# Patient Record
Sex: FEMALE | Race: OTHER | HISPANIC OR LATINO
[De-identification: names, ages, dates, MRNs, and addresses within clinical notes are randomized per-mention and may not be internally consistent; named-entity substitution may affect disease eponyms.]

---

## 2022-11-14 ENCOUNTER — APPOINTMENT (OUTPATIENT)
Dept: OBGYN | Facility: CLINIC | Age: 32
End: 2022-11-14

## 2022-11-14 ENCOUNTER — ASOB RESULT (OUTPATIENT)
Age: 32
End: 2022-11-14

## 2022-11-14 VITALS
WEIGHT: 252 LBS | DIASTOLIC BLOOD PRESSURE: 64 MMHG | BODY MASS INDEX: 46.38 KG/M2 | SYSTOLIC BLOOD PRESSURE: 100 MMHG | HEIGHT: 62 IN | HEART RATE: 82 BPM

## 2022-11-14 DIAGNOSIS — Z78.9 OTHER SPECIFIED HEALTH STATUS: ICD-10-CM

## 2022-11-14 DIAGNOSIS — O36.80X0 PREGNANCY WITH INCONCLUSIVE FETAL VIABILITY, NOT APPLICABLE OR UNSPECIFIED: ICD-10-CM

## 2022-11-14 DIAGNOSIS — Z83.3 FAMILY HISTORY OF DIABETES MELLITUS: ICD-10-CM

## 2022-11-14 PROBLEM — Z00.00 ENCOUNTER FOR PREVENTIVE HEALTH EXAMINATION: Status: ACTIVE | Noted: 2022-11-14

## 2022-11-14 PROCEDURE — 99204 OFFICE O/P NEW MOD 45 MIN: CPT | Mod: TH

## 2022-11-14 PROCEDURE — ZZZZZ: CPT

## 2022-11-14 PROCEDURE — 76817 TRANSVAGINAL US OBSTETRIC: CPT | Mod: 59

## 2022-11-14 NOTE — PROCEDURE
[Amenorrhea] : Amenorrhea [Transvaginal Ultrasound] : transvaginal ultrasound [FreeTextEntry4] : SIUP AT 9+ WKS \par FHR POS

## 2022-11-14 NOTE — PHYSICAL EXAM
[Appropriately responsive] : appropriately responsive [Alert] : alert [No Acute Distress] : no acute distress [No Lymphadenopathy] : no lymphadenopathy [Soft] : soft [Non-tender] : non-tender [Non-distended] : non-distended [No HSM] : No HSM [No Lesions] : no lesions [No Mass] : no mass [Oriented x3] : oriented x3 [Normal] : normal [Uterine Adnexae] : normal

## 2022-11-14 NOTE — HISTORY OF PRESENT ILLNESS
[Patient reported PAP Smear was normal] : Patient reported PAP Smear was normal [N] : Patient denies prior pregnancies [FreeTextEntry1] : 33 yo P-0 LMP- 9- Is here for initial visit for prenatal care.  Patient had a sonogram November 2-7 weeks 4 days and a simple right ovarian cyst 2.7 cm.\par She states she had vaginal bleeding spotting after intercourse today.\par Denies any pain.  Also reports having stomach virus since last night nausea and vomiting and diarrhea.\par Patient weight 252 LB [TextBox_4] : GYNHX\par No history of fibroids, cysts, or STDs\par 11/REG/5\par LAST PAP 1/11/22- slightly abnormal Pap smear and HPV, she did not go for colposcopy [Papeardate] : 1-2022 [PGHxTotal] : 0

## 2022-11-14 NOTE — DISCUSSION/SUMMARY
[FreeTextEntry1] : 33 YO P0 AT 9 + WKS CEDRIC 23, THREATEN , gastroenteritis viral, OBESITY \par SONO SIUP AT 9 + WKS FHR + , \par Hydration apple juice and crackers\par NT in 3 weeks\par PAP NV, PRENATAL LABS\par EARLY GCT \par Return to office 1 month

## 2022-11-28 ENCOUNTER — NON-APPOINTMENT (OUTPATIENT)
Age: 32
End: 2022-11-28

## 2022-12-06 ENCOUNTER — APPOINTMENT (OUTPATIENT)
Dept: OBGYN | Facility: CLINIC | Age: 32
End: 2022-12-06

## 2022-12-06 ENCOUNTER — TRANSCRIPTION ENCOUNTER (OUTPATIENT)
Age: 32
End: 2022-12-06

## 2022-12-06 VITALS
SYSTOLIC BLOOD PRESSURE: 112 MMHG | HEIGHT: 62 IN | HEART RATE: 80 BPM | DIASTOLIC BLOOD PRESSURE: 85 MMHG | BODY MASS INDEX: 44.35 KG/M2 | WEIGHT: 241 LBS | TEMPERATURE: 97.9 F

## 2022-12-06 DIAGNOSIS — N89.8 OTHER SPECIFIED NONINFLAMMATORY DISORDERS OF VAGINA: ICD-10-CM

## 2022-12-06 LAB
HCG UR QL: NEGATIVE
QUALITY CONTROL: YES

## 2022-12-06 PROCEDURE — 81025 URINE PREGNANCY TEST: CPT

## 2022-12-06 PROCEDURE — 99213 OFFICE O/P EST LOW 20 MIN: CPT | Mod: TH

## 2022-12-06 RX ORDER — TERCONAZOLE 8 MG/G
0.8 CREAM VAGINAL
Qty: 1 | Refills: 0 | Status: ACTIVE | COMMUNITY
Start: 2022-12-06 | End: 1900-01-01

## 2022-12-06 NOTE — COUNSELING
[Nutrition/ Exercise/ Weight Management] : nutrition, exercise, weight management [Vitamins/Supplements] : vitamins/supplements [Contraception/ Emergency Contraception/ Safe Sexual Practices] : contraception, emergency contraception, safe sexual practices [Pregnancy Options] : pregnancy options

## 2022-12-06 NOTE — DISCUSSION/SUMMARY
[FreeTextEntry1] : 32-year-old P1 status post missed AB, treatment for appendicitis, yeast infection\par ucg negative today, patient has no pain or bleeding\par Contraceptive options discussed with patient\par Phexxi and condoms advised\par Patient will go forward with colonoscopy and possible appendectomy\par Weight loss discussed\par Patient may follow-up with [facility]  for her annual Pap smear\par Preconception counseling done\par Terazol 3 vaginal

## 2022-12-06 NOTE — HISTORY OF PRESENT ILLNESS
[FreeTextEntry1] : 31 yo p0010 Patient is here for follow/up missed ab 11-.\par Patient states that November 15 in the morning she started bleeding and continue to have nausea vomiting diarrhea.  She called 911 and was taken to [facility] ER where she stayed for several hours and continued bleeding.  Her blood pressure dropped and she was evaluated where she was given 2 units of blood transfusion and Gyn consult was done where she had?  D&C versus removal of retained products of conception at bedside.  She had pelvic sonogram.  She was given Cytotec 4 pills per vagina.  She also had a CAT scan following which she had a tube placed into the peritoneal cavity through her buttock where the abdominal fluid was drained due to infected appendicitis and she was given antibiotics.  She stayed in the hospital for from 11/ 15-11/ 26. \par She she was advised to have colonoscopy after release and she will schedule her appendectomy when she is stable.  She stopped bleeding a week ago.\par \par Patient reports some vaginal itching antibiotics

## 2022-12-15 ENCOUNTER — APPOINTMENT (OUTPATIENT)
Dept: OBGYN | Facility: CLINIC | Age: 32
End: 2022-12-15

## 2023-02-01 ENCOUNTER — APPOINTMENT (OUTPATIENT)
Dept: OBGYN | Facility: CLINIC | Age: 33
End: 2023-02-01
Payer: MEDICAID

## 2023-02-01 VITALS
HEIGHT: 63 IN | HEART RATE: 79 BPM | SYSTOLIC BLOOD PRESSURE: 118 MMHG | DIASTOLIC BLOOD PRESSURE: 78 MMHG | WEIGHT: 241 LBS | BODY MASS INDEX: 42.7 KG/M2

## 2023-02-01 DIAGNOSIS — Z86.39 PERSONAL HISTORY OF OTHER ENDOCRINE, NUTRITIONAL AND METABOLIC DISEASE: ICD-10-CM

## 2023-02-01 PROCEDURE — 99395 PREV VISIT EST AGE 18-39: CPT

## 2023-02-02 PROBLEM — Z86.39 HISTORY OF OBESITY: Status: RESOLVED | Noted: 2023-02-02 | Resolved: 2023-02-02

## 2023-02-02 NOTE — HISTORY OF PRESENT ILLNESS
[Patient reported PAP Smear was normal] : Patient reported PAP Smear was normal [N] : Patient denies prior pregnancies [FreeTextEntry1] : 33 yo P-0010  LMP- 1- IS here for annual exam , menses every 30 days a part , lasting 5-7 days , moderate flow , no cramps \par She had period after sab in december and then in january , spotted 1/11-1/14 then had a period for 5 days.\par patient using condoms\par \par h/o D &S fo MAB with retained pco and blood transfusion , infected appendix  ( see prior note)\par Patient is scheduled to have colonoscopy after which she will have appendectomy  [TextBox_4] : GYNHX\par No history of fibroids, cysts, or STDs\par 11/REG/5\par LAST PAP 1/11/22- slightly abnormal Pap smear and HPV, she did not go for colposcopy [Papeardate] : 1-2022 [PGHxTotal] : 0

## 2023-02-02 NOTE — PHYSICAL EXAM
[Appropriately responsive] : appropriately responsive [Alert] : alert [No Acute Distress] : no acute distress [No Lymphadenopathy] : no lymphadenopathy [Regular Rate Rhythm] : regular rate rhythm [No Murmurs] : no murmurs [Soft] : soft [Non-tender] : non-tender [Non-distended] : non-distended [No HSM] : No HSM [No Lesions] : no lesions [No Mass] : no mass [Oriented x3] : oriented x3 [Examination Of The Breasts] : a normal appearance [No Discharge] : no discharge [No Masses] : no breast masses were palpable [Labia Majora] : normal [Labia Minora] : normal [No Bleeding] : There was no active vaginal bleeding [Normal] : normal [Normal Position] : in a normal position [Uterine Adnexae] : normal [FreeTextEntry6] : difficult to asses adnexa due to body habitus, but grossly nl

## 2023-02-02 NOTE — DISCUSSION/SUMMARY
[FreeTextEntry1] : 33 yo  for annual exam ,obesity\par pap hpv\par plan- colonoscopy , appendectomy\par contraception d/w patient \par weight loss d/w patient

## 2023-02-06 LAB
CYTOLOGY CVX/VAG DOC THIN PREP: NORMAL
HPV HIGH+LOW RISK DNA PNL CVX: NOT DETECTED

## 2023-04-15 ENCOUNTER — INPATIENT (INPATIENT)
Facility: HOSPITAL | Age: 33
LOS: 2 days | Discharge: HOME | DRG: 721 | End: 2023-04-18
Attending: SURGERY | Admitting: SURGERY
Payer: MEDICAID

## 2023-04-15 VITALS
WEIGHT: 250 LBS | HEART RATE: 103 BPM | DIASTOLIC BLOOD PRESSURE: 65 MMHG | TEMPERATURE: 100 F | OXYGEN SATURATION: 97 % | SYSTOLIC BLOOD PRESSURE: 127 MMHG | RESPIRATION RATE: 18 BRPM

## 2023-04-15 DIAGNOSIS — Z90.49 ACQUIRED ABSENCE OF OTHER SPECIFIED PARTS OF DIGESTIVE TRACT: ICD-10-CM

## 2023-04-15 LAB
ALBUMIN SERPL ELPH-MCNC: 3.9 G/DL — SIGNIFICANT CHANGE UP (ref 3.5–5.2)
ALP SERPL-CCNC: 95 U/L — SIGNIFICANT CHANGE UP (ref 30–115)
ALT FLD-CCNC: 21 U/L — SIGNIFICANT CHANGE UP (ref 0–41)
ANION GAP SERPL CALC-SCNC: 11 MMOL/L — SIGNIFICANT CHANGE UP (ref 7–14)
ANION GAP SERPL CALC-SCNC: 14 MMOL/L — SIGNIFICANT CHANGE UP (ref 7–14)
APPEARANCE UR: CLEAR — SIGNIFICANT CHANGE UP
APPEARANCE UR: CLEAR — SIGNIFICANT CHANGE UP
AST SERPL-CCNC: 14 U/L — SIGNIFICANT CHANGE UP (ref 0–41)
BACTERIA # UR AUTO: NEGATIVE — SIGNIFICANT CHANGE UP
BACTERIA # UR AUTO: NEGATIVE — SIGNIFICANT CHANGE UP
BASOPHILS # BLD AUTO: 0.04 K/UL — SIGNIFICANT CHANGE UP (ref 0–0.2)
BASOPHILS # BLD AUTO: 0.05 K/UL — SIGNIFICANT CHANGE UP (ref 0–0.2)
BASOPHILS NFR BLD AUTO: 0.2 % — SIGNIFICANT CHANGE UP (ref 0–1)
BASOPHILS NFR BLD AUTO: 0.4 % — SIGNIFICANT CHANGE UP (ref 0–1)
BILIRUB SERPL-MCNC: 0.4 MG/DL — SIGNIFICANT CHANGE UP (ref 0.2–1.2)
BILIRUB UR-MCNC: NEGATIVE — SIGNIFICANT CHANGE UP
BILIRUB UR-MCNC: NEGATIVE — SIGNIFICANT CHANGE UP
BLD GP AB SCN SERPL QL: SIGNIFICANT CHANGE UP
BUN SERPL-MCNC: 6 MG/DL — LOW (ref 10–20)
BUN SERPL-MCNC: 7 MG/DL — LOW (ref 10–20)
CALCIUM SERPL-MCNC: 8.8 MG/DL — SIGNIFICANT CHANGE UP (ref 8.4–10.5)
CALCIUM SERPL-MCNC: 8.9 MG/DL — SIGNIFICANT CHANGE UP (ref 8.4–10.5)
CHLORIDE SERPL-SCNC: 101 MMOL/L — SIGNIFICANT CHANGE UP (ref 98–110)
CHLORIDE SERPL-SCNC: 102 MMOL/L — SIGNIFICANT CHANGE UP (ref 98–110)
CO2 SERPL-SCNC: 21 MMOL/L — SIGNIFICANT CHANGE UP (ref 17–32)
CO2 SERPL-SCNC: 24 MMOL/L — SIGNIFICANT CHANGE UP (ref 17–32)
COLOR SPEC: YELLOW — SIGNIFICANT CHANGE UP
COLOR SPEC: YELLOW — SIGNIFICANT CHANGE UP
CREAT SERPL-MCNC: 0.6 MG/DL — LOW (ref 0.7–1.5)
CREAT SERPL-MCNC: 0.6 MG/DL — LOW (ref 0.7–1.5)
DIFF PNL FLD: NEGATIVE — SIGNIFICANT CHANGE UP
DIFF PNL FLD: NEGATIVE — SIGNIFICANT CHANGE UP
EGFR: 121 ML/MIN/1.73M2 — SIGNIFICANT CHANGE UP
EGFR: 121 ML/MIN/1.73M2 — SIGNIFICANT CHANGE UP
EOSINOPHIL # BLD AUTO: 0.04 K/UL — SIGNIFICANT CHANGE UP (ref 0–0.7)
EOSINOPHIL # BLD AUTO: 0.06 K/UL — SIGNIFICANT CHANGE UP (ref 0–0.7)
EOSINOPHIL NFR BLD AUTO: 0.2 % — SIGNIFICANT CHANGE UP (ref 0–8)
EOSINOPHIL NFR BLD AUTO: 0.4 % — SIGNIFICANT CHANGE UP (ref 0–8)
EPI CELLS # UR: 11 /HPF — HIGH (ref 0–5)
EPI CELLS # UR: 11 /HPF — HIGH (ref 0–5)
GLUCOSE SERPL-MCNC: 106 MG/DL — HIGH (ref 70–99)
GLUCOSE SERPL-MCNC: 134 MG/DL — HIGH (ref 70–99)
GLUCOSE UR QL: NEGATIVE — SIGNIFICANT CHANGE UP
GLUCOSE UR QL: NEGATIVE — SIGNIFICANT CHANGE UP
HCG SERPL QL: NEGATIVE — SIGNIFICANT CHANGE UP
HCT VFR BLD CALC: 28.3 % — LOW (ref 37–47)
HCT VFR BLD CALC: 28.4 % — LOW (ref 37–47)
HGB BLD-MCNC: 9.1 G/DL — LOW (ref 12–16)
HGB BLD-MCNC: 9.1 G/DL — LOW (ref 12–16)
HYALINE CASTS # UR AUTO: 10 /LPF — HIGH (ref 0–7)
HYALINE CASTS # UR AUTO: 2 /LPF — SIGNIFICANT CHANGE UP (ref 0–7)
IMM GRANULOCYTES NFR BLD AUTO: 0.3 % — SIGNIFICANT CHANGE UP (ref 0.1–0.3)
IMM GRANULOCYTES NFR BLD AUTO: 0.5 % — HIGH (ref 0.1–0.3)
KETONES UR-MCNC: ABNORMAL
KETONES UR-MCNC: NEGATIVE — SIGNIFICANT CHANGE UP
LEUKOCYTE ESTERASE UR-ACNC: NEGATIVE — SIGNIFICANT CHANGE UP
LEUKOCYTE ESTERASE UR-ACNC: NEGATIVE — SIGNIFICANT CHANGE UP
LIDOCAIN IGE QN: 23 U/L — SIGNIFICANT CHANGE UP (ref 7–60)
LYMPHOCYTES # BLD AUTO: 1.3 K/UL — SIGNIFICANT CHANGE UP (ref 1.2–3.4)
LYMPHOCYTES # BLD AUTO: 1.58 K/UL — SIGNIFICANT CHANGE UP (ref 1.2–3.4)
LYMPHOCYTES # BLD AUTO: 11.6 % — LOW (ref 20.5–51.1)
LYMPHOCYTES # BLD AUTO: 7.4 % — LOW (ref 20.5–51.1)
MAGNESIUM SERPL-MCNC: 2 MG/DL — SIGNIFICANT CHANGE UP (ref 1.8–2.4)
MCHC RBC-ENTMCNC: 25.1 PG — LOW (ref 27–31)
MCHC RBC-ENTMCNC: 25.5 PG — LOW (ref 27–31)
MCHC RBC-ENTMCNC: 32 G/DL — SIGNIFICANT CHANGE UP (ref 32–37)
MCHC RBC-ENTMCNC: 32.2 G/DL — SIGNIFICANT CHANGE UP (ref 32–37)
MCV RBC AUTO: 78.5 FL — LOW (ref 81–99)
MCV RBC AUTO: 79.3 FL — LOW (ref 81–99)
MONOCYTES # BLD AUTO: 0.68 K/UL — HIGH (ref 0.1–0.6)
MONOCYTES # BLD AUTO: 0.92 K/UL — HIGH (ref 0.1–0.6)
MONOCYTES NFR BLD AUTO: 5 % — SIGNIFICANT CHANGE UP (ref 1.7–9.3)
MONOCYTES NFR BLD AUTO: 5.3 % — SIGNIFICANT CHANGE UP (ref 1.7–9.3)
NEUTROPHILS # BLD AUTO: 11.23 K/UL — HIGH (ref 1.4–6.5)
NEUTROPHILS # BLD AUTO: 15.13 K/UL — HIGH (ref 1.4–6.5)
NEUTROPHILS NFR BLD AUTO: 82.1 % — HIGH (ref 42.2–75.2)
NEUTROPHILS NFR BLD AUTO: 86.6 % — HIGH (ref 42.2–75.2)
NITRITE UR-MCNC: NEGATIVE — SIGNIFICANT CHANGE UP
NITRITE UR-MCNC: NEGATIVE — SIGNIFICANT CHANGE UP
NRBC # BLD: 0 /100 WBCS — SIGNIFICANT CHANGE UP (ref 0–0)
NRBC # BLD: 0 /100 WBCS — SIGNIFICANT CHANGE UP (ref 0–0)
PH UR: 7 — SIGNIFICANT CHANGE UP (ref 5–8)
PH UR: 7.5 — SIGNIFICANT CHANGE UP (ref 5–8)
PHOSPHATE SERPL-MCNC: 3.2 MG/DL — SIGNIFICANT CHANGE UP (ref 2.1–4.9)
PLATELET # BLD AUTO: 339 K/UL — SIGNIFICANT CHANGE UP (ref 130–400)
PLATELET # BLD AUTO: 342 K/UL — SIGNIFICANT CHANGE UP (ref 130–400)
PMV BLD: 9.5 FL — SIGNIFICANT CHANGE UP (ref 7.4–10.4)
PMV BLD: 9.7 FL — SIGNIFICANT CHANGE UP (ref 7.4–10.4)
POTASSIUM SERPL-MCNC: 4.1 MMOL/L — SIGNIFICANT CHANGE UP (ref 3.5–5)
POTASSIUM SERPL-MCNC: 4.4 MMOL/L — SIGNIFICANT CHANGE UP (ref 3.5–5)
POTASSIUM SERPL-SCNC: 4.1 MMOL/L — SIGNIFICANT CHANGE UP (ref 3.5–5)
POTASSIUM SERPL-SCNC: 4.4 MMOL/L — SIGNIFICANT CHANGE UP (ref 3.5–5)
PROT SERPL-MCNC: 6.9 G/DL — SIGNIFICANT CHANGE UP (ref 6–8)
PROT UR-MCNC: ABNORMAL
PROT UR-MCNC: ABNORMAL
RBC # BLD: 3.57 M/UL — LOW (ref 4.2–5.4)
RBC # BLD: 3.62 M/UL — LOW (ref 4.2–5.4)
RBC # FLD: 15.2 % — HIGH (ref 11.5–14.5)
RBC # FLD: 15.3 % — HIGH (ref 11.5–14.5)
RBC CASTS # UR COMP ASSIST: 2 /HPF — SIGNIFICANT CHANGE UP (ref 0–4)
RBC CASTS # UR COMP ASSIST: 3 /HPF — SIGNIFICANT CHANGE UP (ref 0–4)
SARS-COV-2 RNA SPEC QL NAA+PROBE: SIGNIFICANT CHANGE UP
SODIUM SERPL-SCNC: 136 MMOL/L — SIGNIFICANT CHANGE UP (ref 135–146)
SODIUM SERPL-SCNC: 137 MMOL/L — SIGNIFICANT CHANGE UP (ref 135–146)
SP GR SPEC: 1.03 — HIGH (ref 1.01–1.03)
SP GR SPEC: >1.05 (ref 1.01–1.03)
UROBILINOGEN FLD QL: SIGNIFICANT CHANGE UP
UROBILINOGEN FLD QL: SIGNIFICANT CHANGE UP
WBC # BLD: 13.67 K/UL — HIGH (ref 4.8–10.8)
WBC # BLD: 17.49 K/UL — HIGH (ref 4.8–10.8)
WBC # FLD AUTO: 13.67 K/UL — HIGH (ref 4.8–10.8)
WBC # FLD AUTO: 17.49 K/UL — HIGH (ref 4.8–10.8)
WBC UR QL: 1 /HPF — SIGNIFICANT CHANGE UP (ref 0–5)
WBC UR QL: 2 /HPF — SIGNIFICANT CHANGE UP (ref 0–5)

## 2023-04-15 PROCEDURE — 84100 ASSAY OF PHOSPHORUS: CPT

## 2023-04-15 PROCEDURE — 74177 CT ABD & PELVIS W/CONTRAST: CPT | Mod: 26,MA

## 2023-04-15 PROCEDURE — 83735 ASSAY OF MAGNESIUM: CPT

## 2023-04-15 PROCEDURE — 80048 BASIC METABOLIC PNL TOTAL CA: CPT

## 2023-04-15 PROCEDURE — 81001 URINALYSIS AUTO W/SCOPE: CPT

## 2023-04-15 PROCEDURE — 99285 EMERGENCY DEPT VISIT HI MDM: CPT

## 2023-04-15 PROCEDURE — 87040 BLOOD CULTURE FOR BACTERIA: CPT

## 2023-04-15 PROCEDURE — U0005: CPT

## 2023-04-15 PROCEDURE — U0003: CPT

## 2023-04-15 PROCEDURE — 36415 COLL VENOUS BLD VENIPUNCTURE: CPT

## 2023-04-15 PROCEDURE — 71045 X-RAY EXAM CHEST 1 VIEW: CPT

## 2023-04-15 PROCEDURE — 83615 LACTATE (LD) (LDH) ENZYME: CPT

## 2023-04-15 PROCEDURE — 71045 X-RAY EXAM CHEST 1 VIEW: CPT | Mod: 26

## 2023-04-15 PROCEDURE — 85025 COMPLETE CBC W/AUTO DIFF WBC: CPT

## 2023-04-15 RX ORDER — SODIUM CHLORIDE 9 MG/ML
500 INJECTION, SOLUTION INTRAVENOUS ONCE
Refills: 0 | Status: COMPLETED | OUTPATIENT
Start: 2023-04-15 | End: 2023-04-15

## 2023-04-15 RX ORDER — SODIUM CHLORIDE 9 MG/ML
1000 INJECTION INTRAMUSCULAR; INTRAVENOUS; SUBCUTANEOUS ONCE
Refills: 0 | Status: COMPLETED | OUTPATIENT
Start: 2023-04-15 | End: 2023-04-15

## 2023-04-15 RX ORDER — ONDANSETRON 8 MG/1
4 TABLET, FILM COATED ORAL EVERY 6 HOURS
Refills: 0 | Status: DISCONTINUED | OUTPATIENT
Start: 2023-04-15 | End: 2023-04-18

## 2023-04-15 RX ORDER — SODIUM CHLORIDE 9 MG/ML
1000 INJECTION, SOLUTION INTRAVENOUS ONCE
Refills: 0 | Status: COMPLETED | OUTPATIENT
Start: 2023-04-15 | End: 2023-04-15

## 2023-04-15 RX ORDER — KETOROLAC TROMETHAMINE 30 MG/ML
15 SYRINGE (ML) INJECTION EVERY 6 HOURS
Refills: 0 | Status: DISCONTINUED | OUTPATIENT
Start: 2023-04-15 | End: 2023-04-18

## 2023-04-15 RX ORDER — PIPERACILLIN AND TAZOBACTAM 4; .5 G/20ML; G/20ML
3.38 INJECTION, POWDER, LYOPHILIZED, FOR SOLUTION INTRAVENOUS EVERY 8 HOURS
Refills: 0 | Status: DISCONTINUED | OUTPATIENT
Start: 2023-04-15 | End: 2023-04-18

## 2023-04-15 RX ORDER — ACETAMINOPHEN 500 MG
1000 TABLET ORAL ONCE
Refills: 0 | Status: COMPLETED | OUTPATIENT
Start: 2023-04-15 | End: 2023-04-15

## 2023-04-15 RX ORDER — ENOXAPARIN SODIUM 100 MG/ML
40 INJECTION SUBCUTANEOUS EVERY 24 HOURS
Refills: 0 | Status: DISCONTINUED | OUTPATIENT
Start: 2023-04-15 | End: 2023-04-18

## 2023-04-15 RX ORDER — MORPHINE SULFATE 50 MG/1
4 CAPSULE, EXTENDED RELEASE ORAL ONCE
Refills: 0 | Status: DISCONTINUED | OUTPATIENT
Start: 2023-04-15 | End: 2023-04-15

## 2023-04-15 RX ORDER — ACETAMINOPHEN 500 MG
650 TABLET ORAL EVERY 6 HOURS
Refills: 0 | Status: DISCONTINUED | OUTPATIENT
Start: 2023-04-15 | End: 2023-04-18

## 2023-04-15 RX ORDER — ACETAMINOPHEN 500 MG
975 TABLET ORAL ONCE
Refills: 0 | Status: COMPLETED | OUTPATIENT
Start: 2023-04-15 | End: 2023-04-15

## 2023-04-15 RX ORDER — SODIUM CHLORIDE 9 MG/ML
1000 INJECTION, SOLUTION INTRAVENOUS
Refills: 0 | Status: DISCONTINUED | OUTPATIENT
Start: 2023-04-15 | End: 2023-04-17

## 2023-04-15 RX ORDER — PIPERACILLIN AND TAZOBACTAM 4; .5 G/20ML; G/20ML
3.38 INJECTION, POWDER, LYOPHILIZED, FOR SOLUTION INTRAVENOUS ONCE
Refills: 0 | Status: COMPLETED | OUTPATIENT
Start: 2023-04-15 | End: 2023-04-15

## 2023-04-15 RX ORDER — FAMOTIDINE 10 MG/ML
20 INJECTION INTRAVENOUS EVERY 12 HOURS
Refills: 0 | Status: DISCONTINUED | OUTPATIENT
Start: 2023-04-15 | End: 2023-04-18

## 2023-04-15 RX ADMIN — SODIUM CHLORIDE 1000 MILLILITER(S): 9 INJECTION, SOLUTION INTRAVENOUS at 08:51

## 2023-04-15 RX ADMIN — SODIUM CHLORIDE 125 MILLILITER(S): 9 INJECTION, SOLUTION INTRAVENOUS at 10:45

## 2023-04-15 RX ADMIN — Medication 975 MILLIGRAM(S): at 08:57

## 2023-04-15 RX ADMIN — PIPERACILLIN AND TAZOBACTAM 25 GRAM(S): 4; .5 INJECTION, POWDER, LYOPHILIZED, FOR SOLUTION INTRAVENOUS at 22:54

## 2023-04-15 RX ADMIN — Medication 650 MILLIGRAM(S): at 16:41

## 2023-04-15 RX ADMIN — MORPHINE SULFATE 4 MILLIGRAM(S): 50 CAPSULE, EXTENDED RELEASE ORAL at 05:00

## 2023-04-15 RX ADMIN — Medication 650 MILLIGRAM(S): at 15:41

## 2023-04-15 RX ADMIN — PIPERACILLIN AND TAZOBACTAM 25 GRAM(S): 4; .5 INJECTION, POWDER, LYOPHILIZED, FOR SOLUTION INTRAVENOUS at 14:10

## 2023-04-15 RX ADMIN — MORPHINE SULFATE 4 MILLIGRAM(S): 50 CAPSULE, EXTENDED RELEASE ORAL at 04:45

## 2023-04-15 RX ADMIN — Medication 15 MILLIGRAM(S): at 21:12

## 2023-04-15 RX ADMIN — PIPERACILLIN AND TAZOBACTAM 200 GRAM(S): 4; .5 INJECTION, POWDER, LYOPHILIZED, FOR SOLUTION INTRAVENOUS at 07:48

## 2023-04-15 RX ADMIN — Medication 975 MILLIGRAM(S): at 09:57

## 2023-04-15 RX ADMIN — SODIUM CHLORIDE 1000 MILLILITER(S): 9 INJECTION INTRAMUSCULAR; INTRAVENOUS; SUBCUTANEOUS at 04:14

## 2023-04-15 NOTE — CONSULT NOTE ADULT - ASSESSMENT
ASSESSMENT:  32yF now s/p Lap appy from an outside facility who presents concern for post op pain. Physical exam findings, imaging, and labs as documented above.   CT scan reviewed, foreign body in question is the staple from the appendectomy    PLAN:  - No acute surgical intervention  - IV Resuscitatation  - Augment for 1 week  - Follow up with Dr. Kim in the office  - Dispo per ED with return precautions    Above plan discussed with Attending Surgeon Dr. Kim, patient, and Primary team  04-15-23 @ 08:22   ASSESSMENT:  32yF now s/p Lap appy from an outside facility who presents concern for post op pain. Physical exam findings, imaging, and labs as documented above.   CT scan reviewed, foreign body in question is the staple from the appendectomy    PLAN:  - Admit to general surgery under Dr. Kim  - NPO  - IV Resuscitatation  - zosyn  - Hemodynamic monitoring    Above plan discussed with Attending Surgeon Dr. Kim, patient, and Primary team  04-15-23 @ 08:22

## 2023-04-15 NOTE — H&P ADULT - ASSESSMENT
ASSESSMENT:  32yF now s/p Lap appy from an outside facility who presents concern for post op pain. Physical exam findings, imaging, and labs as documented above.   CT scan reviewed, foreign body in question is the staple from the appendectomy  Tachycardic  Fever 100.6  leukocytosis    PLAN:  - Admit to general surgery under Dr. Kim  - NPO  - IV Resuscitatation  - zosyn  - Hemodynamic monitoring  - DVT ppx    Above plan discussed with Attending Surgeon Dr. Kim, patient, and Primary team  04-15-23 @ 09:20

## 2023-04-15 NOTE — ED PROVIDER NOTE - PROGRESS NOTE DETAILS
CO- pt reassessed after signout, pain controlled when laying flat. abd soft but tenderness to RLQ, pending surg eval, abx hanging

## 2023-04-15 NOTE — ED PROVIDER NOTE - PHYSICAL EXAMINATION
Constitutional: Well developed, well nourished. NAD  Head: Normocephalic, atraumatic.  Eyes: PERRL, EOMI.  ENT: No nasal discharge. Mucous membranes dry.  Neck: Supple. Painless ROM.  Cardiovascular:  Regular rate and rhythm   Pulmonary:   Lungs clear to auscultation bilaterally.   Abdominal: Soft mild diffuse tenderness to lower abdomen; laparoscopic incisions note without signs of infection  Extremities. Pelvis stable. No lower extremity edema, symmetric calves.  Skin: No rashes, cyanosis.  Neuro: AAOx3. No focal neurological deficits.  Psych: Normal mood. Normal affect.

## 2023-04-15 NOTE — ED ADULT TRIAGE NOTE - CHIEF COMPLAINT QUOTE
pt presents for abdominal pain, chills, fever, had appendectomy on 4/10. pt states [facility] told her they left a piece of metal in her during surgery.

## 2023-04-15 NOTE — CONSULT NOTE ADULT - SUBJECTIVE AND OBJECTIVE BOX
GENERAL SURGERY CONSULT NOTE    Patient: NADIA MURPHY , 32y ([birth year])Female   MRN: [id number]  Location: [facility] ED  Visit: 04-15-23 Emergency  Date: 04-15-23 @ 08:22    HPI:  Ms. Murphy is a pleasant 31yo F now s/p Lap appy from an outside facility presenting with complaint of mid abd pain with as associated temperature of 100.1 at home. She initially presented back to the outside facility and was informed of a foreign body on CT scan. She left and came here for further evaluation and care. She denies urinary changes, chest pain, SOB    PAST MEDICAL & SURGICAL HISTORY:  H/O appendicitis          Home Medications:        VITALS:  T(F): 99.6 (04-15-23 @ 02:28), Max: 99.6 (04-15-23 @ 02:28)  HR: 103 (04-15-23 @ 02:28) (103 - 103)  BP: 127/65 (04-15-23 @ 02:28) (127/65 - 127/65)  RR: 18 (04-15-23 @ 02:28) (18 - 18)  SpO2: 97% (04-15-23 @ 02:28) (97% - 97%)    PHYSICAL EXAM:  General: NAD, AAOx3, calm and cooperative  HEENT: NCAT, CHRISTOPHER, EOMI, Trachea ML, Neck supple  Cardiac: reg rate  Respiratory: Non-labored on RA  Abdomen: Soft, appropriately tender, non-distended. Incisions are clean dry and intact  Skin: Warm/dry, normal color, no jaundice  Incision/wound: healing well, dressings in place, clean, dry and intact    MEDICATIONS  (STANDING):    MEDICATIONS  (PRN):      LAB/STUDIES:                        9.1    13.67 )-----------( 342      ( 15 Apr 2023 03:52 )             28.4     04-15    136  |  101  |  7<L>  ----------------------------<  134<H>  4.1   |  24  |  0.6<L>    Ca    8.9      15 Apr 2023 03:52    TPro  6.9  /  Alb  3.9  /  TBili  0.4  /  DBili  x   /  AST  14  /  ALT  21  /  AlkPhos  95  04-15      LIVER FUNCTIONS - ( 15 Apr 2023 03:52 )  Alb: 3.9 g/dL / Pro: 6.9 g/dL / ALK PHOS: 95 U/L / ALT: 21 U/L / AST: 14 U/L / GGT: x           IMAGING:  < from: CT Abdomen and Pelvis w/ IV Cont (04.15.23 @ 06:17) >  Post appendectomy withmetallic suture material at the region of the   cecum, likely corresponding to foreign body finding on outside report    Mesentery edema and fat stranding adjacent to cecum. No contained fluid   collection with wall enhancement to suggest abscess. Nointra-abdominal   free air. Findings suggestive of phlegmonous changes/severe inflammatory   changes.    < end of copied text >      ACCESS DEVICES:  [x] Peripheral IV     GENERAL SURGERY CONSULT NOTE    Patient: NADIA MURPHY , 32y ([birth year])Female   MRN: [id number]  Location: [facility] ED  Visit: 04-15-23 Emergency  Date: 04-15-23 @ 08:22    HPI:  Ms. Murphy is a pleasant 31yo F now s/p Lap appy from an outside facility presenting with complaint of mid abd pain with as associated temperature of 100.1 at home. She initially presented back to the outside facility and was informed of a foreign body on CT scan. She left and came here for further evaluation and care. She denies urinary changes, chest pain, SOB    PAST MEDICAL & SURGICAL HISTORY:  H/O appendicitis      Home Medications:    VITALS:  T(F): 99.6 (04-15-23 @ 02:28), Max: 99.6 (04-15-23 @ 02:28)  HR: 103 (04-15-23 @ 02:28) (103 - 103)  BP: 127/65 (04-15-23 @ 02:28) (127/65 - 127/65)  RR: 18 (04-15-23 @ 02:28) (18 - 18)  SpO2: 97% (04-15-23 @ 02:28) (97% - 97%)    PHYSICAL EXAM:  General: NAD, AAOx3, calm and cooperative  HEENT: NCAT, CHRISTOPHER, EOMI, Trachea ML, Neck supple  Cardiac: reg rate  Respiratory: Non-labored on RA  Abdomen: Soft, appropriately tender, non-distended. Incisions are clean dry and intact  Skin: Warm/dry, normal color, no jaundice  Incision/wound: healing well, dressings in place, clean, dry and intact    MEDICATIONS  (STANDING):    MEDICATIONS  (PRN):      LAB/STUDIES:                        9.1    13.67 )-----------( 342      ( 15 Apr 2023 03:52 )             28.4     04-15    136  |  101  |  7<L>  ----------------------------<  134<H>  4.1   |  24  |  0.6<L>    Ca    8.9      15 Apr 2023 03:52    TPro  6.9  /  Alb  3.9  /  TBili  0.4  /  DBili  x   /  AST  14  /  ALT  21  /  AlkPhos  95  04-15      LIVER FUNCTIONS - ( 15 Apr 2023 03:52 )  Alb: 3.9 g/dL / Pro: 6.9 g/dL / ALK PHOS: 95 U/L / ALT: 21 U/L / AST: 14 U/L / GGT: x           IMAGING:  < from: CT Abdomen and Pelvis w/ IV Cont (04.15.23 @ 06:17) >  Post appendectomy withmetallic suture material at the region of the   cecum, likely corresponding to foreign body finding on outside report    Mesentery edema and fat stranding adjacent to cecum. No contained fluid   collection with wall enhancement to suggest abscess. Nointra-abdominal   free air. Findings suggestive of phlegmonous changes/severe inflammatory   changes.    < end of copied text >      ACCESS DEVICES:  [x] Peripheral IV

## 2023-04-15 NOTE — ED PROVIDER NOTE - ATTENDING APP SHARED VISIT CONTRIBUTION OF CARE
I personally evaluated the patient. I reviewed the Resident’s or Physician Assistant’s note (as assigned above), and agree with the findings and plan except as documented in my note.  32-year-old, no past medical history, status post appendectomy 7 days ago by Dr. Kim, presents with progressing lower abdominal pain and associated fever.  Patient reports that her temperature was 101 Fahrenheit at home.  Reports nausea without vomiting.  Denies any coughing, shortness of breath.  Patient also reports some dysuria, no hematuria.  Patient went to room 68 hours ago for evaluation and states that they did labs and CAT scan of her abdomen.  She was told that her CAT scan revealed a possible foreign body left intra-abdominal likely from the surgery.  Patient stated that she eloped and came here for further care.  VSS, non toxic appearing, NAD, Head NCAT, MMM, neck supple, normal ROM, normal s1s2, lungs ctab, abd s/nd, generalized tenderness to palpation, no guarding or rebound, healing surgical wound on the abdomen, extremities wnl, AAO x 3, GCS 15, neuro grossly normal. No acute skin lesions. Plan is labs, meds, surgical consult and dispo accordingly

## 2023-04-15 NOTE — ED ADULT NURSE NOTE - OBJECTIVE STATEMENT
pt states she left [facility] ED and came here. pt states she had CT scan in [facility] that showed foreign metallic object in abdomen. pt states she had appendectomy done at [facility] on monday.

## 2023-04-15 NOTE — H&P ADULT - HISTORY OF PRESENT ILLNESS
GENERAL SURGERY CONSULT NOTE    Patient: NADIA MURPHY , 32y ([birth year])Female   MRN: [id number]  Location: [facility] ED  Visit: 04-15-23 Emergency  Date: 04-15-23 @ 09:20    HPI:  Ms. Murphy is a pleasant 31yo F now s/p Lap appy from an outside facility presenting with complaint of mid abd pain with as associated temperature of 100.1 at home. She initially presented back to the outside facility and was informed of a foreign body on CT scan. She left and came here for further evaluation and care. She denies urinary changes, chest pain, SOB

## 2023-04-15 NOTE — ED ADULT NURSE NOTE - CHIEF COMPLAINT QUOTE
pt presents for abdominal pain, chills, fever, had appendectomy on 4/10. pt states [facility] told her they left a piece of metal in her during surgery.

## 2023-04-15 NOTE — ED ADULT NURSE REASSESSMENT NOTE - NS ED NURSE REASSESS COMMENT FT1
Report received from RN. Patient reassessed. A&O x4. Patient denies pain/discomfort. Safety & comfort maintained.

## 2023-04-15 NOTE — H&P ADULT - NSHPLABSRESULTS_GEN_ALL_CORE
LAB/STUDIES:                        9.1    13.67 )-----------( 342      ( 15 Apr 2023 03:52 )             28.4     04-15    136  |  101  |  7<L>  ----------------------------<  134<H>  4.1   |  24  |  0.6<L>    Ca    8.9      15 Apr 2023 03:52    TPro  6.9  /  Alb  3.9  /  TBili  0.4  /  DBili  x   /  AST  14  /  ALT  21  /  AlkPhos  95  04-15      LIVER FUNCTIONS - ( 15 Apr 2023 03:52 )  Alb: 3.9 g/dL / Pro: 6.9 g/dL / ALK PHOS: 95 U/L / ALT: 21 U/L / AST: 14 U/L / GGT: x           IMAGING:  < from: CT Abdomen and Pelvis w/ IV Cont (04.15.23 @ 06:17) >  Post appendectomy withmetallic suture material at the region of the   cecum, likely corresponding to foreign body finding on outside report    Mesentery edema and fat stranding adjacent to cecum. No contained fluid   collection with wall enhancement to suggest abscess. Nointra-abdominal   free air. Findings suggestive of phlegmonous changes/severe inflammatory   changes.    < end of copied text >      ACCESS DEVICES:  [x] Peripheral IV

## 2023-04-15 NOTE — ED PROVIDER NOTE - NS ED ATTENDING STATEMENT MOD
This was a shared visit with the PERNELL. I reviewed and verified the documentation and independently performed the documented:

## 2023-04-15 NOTE — H&P ADULT - NSHPPHYSICALEXAM_GEN_ALL_CORE
VITALS:  T(F): 99.6 (04-15-23 @ 02:28), Max: 99.6 (04-15-23 @ 02:28)  HR: 103 (04-15-23 @ 02:28) (103 - 103)  BP: 127/65 (04-15-23 @ 02:28) (127/65 - 127/65)  RR: 18 (04-15-23 @ 02:28) (18 - 18)  SpO2: 97% (04-15-23 @ 02:28) (97% - 97%)    PHYSICAL EXAM:  General: NAD, AAOx3, calm and cooperative  HEENT: NCAT, CHRISTOPHER, EOMI, Trachea ML, Neck supple  Cardiac: reg rate  Respiratory: Non-labored on RA  Abdomen: Soft, appropriately tender, non-distended. Incisions are clean dry and intact  Skin: Warm/dry, normal color, no jaundice  Incision/wound: healing well, dressings in place, clean, dry and intact

## 2023-04-16 LAB
ANION GAP SERPL CALC-SCNC: 10 MMOL/L — SIGNIFICANT CHANGE UP (ref 7–14)
BASOPHILS # BLD AUTO: 0.05 K/UL — SIGNIFICANT CHANGE UP (ref 0–0.2)
BASOPHILS NFR BLD AUTO: 0.3 % — SIGNIFICANT CHANGE UP (ref 0–1)
BILIRUB SERPL-MCNC: 0.7 MG/DL — SIGNIFICANT CHANGE UP (ref 0.2–1.2)
BUN SERPL-MCNC: 4 MG/DL — LOW (ref 10–20)
CALCIUM SERPL-MCNC: 8.1 MG/DL — LOW (ref 8.4–10.5)
CHLORIDE SERPL-SCNC: 102 MMOL/L — SIGNIFICANT CHANGE UP (ref 98–110)
CO2 SERPL-SCNC: 24 MMOL/L — SIGNIFICANT CHANGE UP (ref 17–32)
CREAT SERPL-MCNC: 0.5 MG/DL — LOW (ref 0.7–1.5)
CREAT SERPL-MCNC: 0.6 MG/DL — LOW (ref 0.7–1.5)
EGFR: 121 ML/MIN/1.73M2 — SIGNIFICANT CHANGE UP
EGFR: 126 ML/MIN/1.73M2 — SIGNIFICANT CHANGE UP
EOSINOPHIL # BLD AUTO: 0.03 K/UL — SIGNIFICANT CHANGE UP (ref 0–0.7)
EOSINOPHIL NFR BLD AUTO: 0.2 % — SIGNIFICANT CHANGE UP (ref 0–8)
GLUCOSE SERPL-MCNC: 124 MG/DL — HIGH (ref 70–99)
HCT VFR BLD CALC: 24.8 % — LOW (ref 37–47)
HGB BLD-MCNC: 7.8 G/DL — LOW (ref 12–16)
IMM GRANULOCYTES NFR BLD AUTO: 0.6 % — HIGH (ref 0.1–0.3)
INR BLD: 1.36 RATIO — HIGH (ref 0.65–1.3)
LYMPHOCYTES # BLD AUTO: 1.8 K/UL — SIGNIFICANT CHANGE UP (ref 1.2–3.4)
LYMPHOCYTES # BLD AUTO: 10.5 % — LOW (ref 20.5–51.1)
MAGNESIUM SERPL-MCNC: 1.9 MG/DL — SIGNIFICANT CHANGE UP (ref 1.8–2.4)
MCHC RBC-ENTMCNC: 24.8 PG — LOW (ref 27–31)
MCHC RBC-ENTMCNC: 31.5 G/DL — LOW (ref 32–37)
MCV RBC AUTO: 79 FL — LOW (ref 81–99)
MELD SCORE WITH DIALYSIS: 24 POINTS — SIGNIFICANT CHANGE UP
MELD SCORE WITHOUT DIALYSIS: 10 POINTS — SIGNIFICANT CHANGE UP
MONOCYTES # BLD AUTO: 1.11 K/UL — HIGH (ref 0.1–0.6)
MONOCYTES NFR BLD AUTO: 6.5 % — SIGNIFICANT CHANGE UP (ref 1.7–9.3)
NEUTROPHILS # BLD AUTO: 14.06 K/UL — HIGH (ref 1.4–6.5)
NEUTROPHILS NFR BLD AUTO: 81.9 % — HIGH (ref 42.2–75.2)
NRBC # BLD: 0 /100 WBCS — SIGNIFICANT CHANGE UP (ref 0–0)
PHOSPHATE SERPL-MCNC: 2.5 MG/DL — SIGNIFICANT CHANGE UP (ref 2.1–4.9)
PLATELET # BLD AUTO: 356 K/UL — SIGNIFICANT CHANGE UP (ref 130–400)
PMV BLD: 9.7 FL — SIGNIFICANT CHANGE UP (ref 7.4–10.4)
POTASSIUM SERPL-MCNC: 4.3 MMOL/L — SIGNIFICANT CHANGE UP (ref 3.5–5)
POTASSIUM SERPL-SCNC: 4.3 MMOL/L — SIGNIFICANT CHANGE UP (ref 3.5–5)
PROTHROM AB SERPL-ACNC: 15.6 SEC — HIGH (ref 9.95–12.87)
RBC # BLD: 3.14 M/UL — LOW (ref 4.2–5.4)
RBC # FLD: 15.3 % — HIGH (ref 11.5–14.5)
SODIUM SERPL-SCNC: 135 MMOL/L — SIGNIFICANT CHANGE UP (ref 135–146)
SODIUM SERPL-SCNC: 136 MMOL/L — SIGNIFICANT CHANGE UP (ref 135–146)
WBC # BLD: 17.15 K/UL — HIGH (ref 4.8–10.8)
WBC # FLD AUTO: 17.15 K/UL — HIGH (ref 4.8–10.8)

## 2023-04-16 RX ORDER — POLYETHYLENE GLYCOL 3350 17 G/17G
17 POWDER, FOR SOLUTION ORAL DAILY
Refills: 0 | Status: DISCONTINUED | OUTPATIENT
Start: 2023-04-16 | End: 2023-04-18

## 2023-04-16 RX ORDER — SODIUM CHLORIDE 9 MG/ML
500 INJECTION, SOLUTION INTRAVENOUS ONCE
Refills: 0 | Status: COMPLETED | OUTPATIENT
Start: 2023-04-16 | End: 2023-04-16

## 2023-04-16 RX ORDER — SENNA PLUS 8.6 MG/1
2 TABLET ORAL AT BEDTIME
Refills: 0 | Status: DISCONTINUED | OUTPATIENT
Start: 2023-04-16 | End: 2023-04-18

## 2023-04-16 RX ORDER — SODIUM CHLORIDE 9 MG/ML
1000 INJECTION, SOLUTION INTRAVENOUS ONCE
Refills: 0 | Status: COMPLETED | OUTPATIENT
Start: 2023-04-16 | End: 2023-04-16

## 2023-04-16 RX ADMIN — Medication 650 MILLIGRAM(S): at 17:13

## 2023-04-16 RX ADMIN — PIPERACILLIN AND TAZOBACTAM 25 GRAM(S): 4; .5 INJECTION, POWDER, LYOPHILIZED, FOR SOLUTION INTRAVENOUS at 14:09

## 2023-04-16 RX ADMIN — ENOXAPARIN SODIUM 40 MILLIGRAM(S): 100 INJECTION SUBCUTANEOUS at 17:13

## 2023-04-16 RX ADMIN — SODIUM CHLORIDE 500 MILLILITER(S): 9 INJECTION, SOLUTION INTRAVENOUS at 04:30

## 2023-04-16 RX ADMIN — Medication 400 MILLIGRAM(S): at 00:11

## 2023-04-16 RX ADMIN — SODIUM CHLORIDE 125 MILLILITER(S): 9 INJECTION, SOLUTION INTRAVENOUS at 08:34

## 2023-04-16 RX ADMIN — PIPERACILLIN AND TAZOBACTAM 25 GRAM(S): 4; .5 INJECTION, POWDER, LYOPHILIZED, FOR SOLUTION INTRAVENOUS at 05:12

## 2023-04-16 RX ADMIN — SODIUM CHLORIDE 1000 MILLILITER(S): 9 INJECTION, SOLUTION INTRAVENOUS at 17:39

## 2023-04-16 RX ADMIN — POLYETHYLENE GLYCOL 3350 17 GRAM(S): 17 POWDER, FOR SOLUTION ORAL at 11:03

## 2023-04-16 RX ADMIN — Medication 650 MILLIGRAM(S): at 05:12

## 2023-04-16 RX ADMIN — SODIUM CHLORIDE 125 MILLILITER(S): 9 INJECTION, SOLUTION INTRAVENOUS at 03:29

## 2023-04-16 RX ADMIN — Medication 650 MILLIGRAM(S): at 05:42

## 2023-04-16 RX ADMIN — PIPERACILLIN AND TAZOBACTAM 25 GRAM(S): 4; .5 INJECTION, POWDER, LYOPHILIZED, FOR SOLUTION INTRAVENOUS at 21:19

## 2023-04-16 RX ADMIN — SODIUM CHLORIDE 125 MILLILITER(S): 9 INJECTION, SOLUTION INTRAVENOUS at 21:18

## 2023-04-16 RX ADMIN — Medication 650 MILLIGRAM(S): at 11:03

## 2023-04-16 RX ADMIN — SODIUM CHLORIDE 500 MILLILITER(S): 9 INJECTION, SOLUTION INTRAVENOUS at 00:12

## 2023-04-16 NOTE — PATIENT PROFILE ADULT - FALL HARM RISK - HARM RISK INTERVENTIONS

## 2023-04-17 LAB
ANION GAP SERPL CALC-SCNC: 12 MMOL/L — SIGNIFICANT CHANGE UP (ref 7–14)
ANION GAP SERPL CALC-SCNC: 13 MMOL/L — SIGNIFICANT CHANGE UP (ref 7–14)
BASOPHILS # BLD AUTO: 0.04 K/UL — SIGNIFICANT CHANGE UP (ref 0–0.2)
BASOPHILS # BLD AUTO: 0.05 K/UL — SIGNIFICANT CHANGE UP (ref 0–0.2)
BASOPHILS NFR BLD AUTO: 0.2 % — SIGNIFICANT CHANGE UP (ref 0–1)
BASOPHILS NFR BLD AUTO: 0.2 % — SIGNIFICANT CHANGE UP (ref 0–1)
BUN SERPL-MCNC: 3 MG/DL — LOW (ref 10–20)
BUN SERPL-MCNC: <3 MG/DL — LOW (ref 10–20)
CALCIUM SERPL-MCNC: 8.3 MG/DL — LOW (ref 8.4–10.5)
CALCIUM SERPL-MCNC: 8.4 MG/DL — SIGNIFICANT CHANGE UP (ref 8.4–10.5)
CHLORIDE SERPL-SCNC: 101 MMOL/L — SIGNIFICANT CHANGE UP (ref 98–110)
CHLORIDE SERPL-SCNC: 99 MMOL/L — SIGNIFICANT CHANGE UP (ref 98–110)
CO2 SERPL-SCNC: 23 MMOL/L — SIGNIFICANT CHANGE UP (ref 17–32)
CO2 SERPL-SCNC: 23 MMOL/L — SIGNIFICANT CHANGE UP (ref 17–32)
CREAT SERPL-MCNC: 0.5 MG/DL — LOW (ref 0.7–1.5)
CREAT SERPL-MCNC: 0.5 MG/DL — LOW (ref 0.7–1.5)
EGFR: 126 ML/MIN/1.73M2 — SIGNIFICANT CHANGE UP
EGFR: 126 ML/MIN/1.73M2 — SIGNIFICANT CHANGE UP
EOSINOPHIL # BLD AUTO: 0.02 K/UL — SIGNIFICANT CHANGE UP (ref 0–0.7)
EOSINOPHIL # BLD AUTO: 0.03 K/UL — SIGNIFICANT CHANGE UP (ref 0–0.7)
EOSINOPHIL NFR BLD AUTO: 0.1 % — SIGNIFICANT CHANGE UP (ref 0–8)
EOSINOPHIL NFR BLD AUTO: 0.1 % — SIGNIFICANT CHANGE UP (ref 0–8)
GLUCOSE SERPL-MCNC: 132 MG/DL — HIGH (ref 70–99)
GLUCOSE SERPL-MCNC: 136 MG/DL — HIGH (ref 70–99)
HCT VFR BLD CALC: 25.1 % — LOW (ref 37–47)
HCT VFR BLD CALC: 25.7 % — LOW (ref 37–47)
HGB BLD-MCNC: 8 G/DL — LOW (ref 12–16)
HGB BLD-MCNC: 8.1 G/DL — LOW (ref 12–16)
IMM GRANULOCYTES NFR BLD AUTO: 0.8 % — HIGH (ref 0.1–0.3)
IMM GRANULOCYTES NFR BLD AUTO: 1 % — HIGH (ref 0.1–0.3)
LDH SERPL L TO P-CCNC: 252 — HIGH (ref 50–242)
LYMPHOCYTES # BLD AUTO: 1.22 K/UL — SIGNIFICANT CHANGE UP (ref 1.2–3.4)
LYMPHOCYTES # BLD AUTO: 1.76 K/UL — SIGNIFICANT CHANGE UP (ref 1.2–3.4)
LYMPHOCYTES # BLD AUTO: 6 % — LOW (ref 20.5–51.1)
LYMPHOCYTES # BLD AUTO: 8.7 % — LOW (ref 20.5–51.1)
MAGNESIUM SERPL-MCNC: 1.9 MG/DL — SIGNIFICANT CHANGE UP (ref 1.8–2.4)
MAGNESIUM SERPL-MCNC: 1.9 MG/DL — SIGNIFICANT CHANGE UP (ref 1.8–2.4)
MCHC RBC-ENTMCNC: 24.9 PG — LOW (ref 27–31)
MCHC RBC-ENTMCNC: 25 PG — LOW (ref 27–31)
MCHC RBC-ENTMCNC: 31.5 G/DL — LOW (ref 32–37)
MCHC RBC-ENTMCNC: 31.9 G/DL — LOW (ref 32–37)
MCV RBC AUTO: 78.2 FL — LOW (ref 81–99)
MCV RBC AUTO: 79.3 FL — LOW (ref 81–99)
MONOCYTES # BLD AUTO: 1.17 K/UL — HIGH (ref 0.1–0.6)
MONOCYTES # BLD AUTO: 1.25 K/UL — HIGH (ref 0.1–0.6)
MONOCYTES NFR BLD AUTO: 5.8 % — SIGNIFICANT CHANGE UP (ref 1.7–9.3)
MONOCYTES NFR BLD AUTO: 6.1 % — SIGNIFICANT CHANGE UP (ref 1.7–9.3)
NEUTROPHILS # BLD AUTO: 17.06 K/UL — HIGH (ref 1.4–6.5)
NEUTROPHILS # BLD AUTO: 17.63 K/UL — HIGH (ref 1.4–6.5)
NEUTROPHILS NFR BLD AUTO: 84.4 % — HIGH (ref 42.2–75.2)
NEUTROPHILS NFR BLD AUTO: 86.6 % — HIGH (ref 42.2–75.2)
NRBC # BLD: 0 /100 WBCS — SIGNIFICANT CHANGE UP (ref 0–0)
NRBC # BLD: 0 /100 WBCS — SIGNIFICANT CHANGE UP (ref 0–0)
PHOSPHATE SERPL-MCNC: 2.5 MG/DL — SIGNIFICANT CHANGE UP (ref 2.1–4.9)
PHOSPHATE SERPL-MCNC: 2.5 MG/DL — SIGNIFICANT CHANGE UP (ref 2.1–4.9)
PLATELET # BLD AUTO: 382 K/UL — SIGNIFICANT CHANGE UP (ref 130–400)
PLATELET # BLD AUTO: 400 K/UL — SIGNIFICANT CHANGE UP (ref 130–400)
PMV BLD: 9.6 FL — SIGNIFICANT CHANGE UP (ref 7.4–10.4)
PMV BLD: 9.7 FL — SIGNIFICANT CHANGE UP (ref 7.4–10.4)
POTASSIUM SERPL-MCNC: 3.8 MMOL/L — SIGNIFICANT CHANGE UP (ref 3.5–5)
POTASSIUM SERPL-MCNC: 3.8 MMOL/L — SIGNIFICANT CHANGE UP (ref 3.5–5)
POTASSIUM SERPL-SCNC: 3.8 MMOL/L — SIGNIFICANT CHANGE UP (ref 3.5–5)
POTASSIUM SERPL-SCNC: 3.8 MMOL/L — SIGNIFICANT CHANGE UP (ref 3.5–5)
RBC # BLD: 3.21 M/UL — LOW (ref 4.2–5.4)
RBC # BLD: 3.24 M/UL — LOW (ref 4.2–5.4)
RBC # FLD: 15.3 % — HIGH (ref 11.5–14.5)
RBC # FLD: 15.6 % — HIGH (ref 11.5–14.5)
SODIUM SERPL-SCNC: 134 MMOL/L — LOW (ref 135–146)
SODIUM SERPL-SCNC: 137 MMOL/L — SIGNIFICANT CHANGE UP (ref 135–146)
WBC # BLD: 20.22 K/UL — HIGH (ref 4.8–10.8)
WBC # BLD: 20.37 K/UL — HIGH (ref 4.8–10.8)
WBC # FLD AUTO: 20.22 K/UL — HIGH (ref 4.8–10.8)
WBC # FLD AUTO: 20.37 K/UL — HIGH (ref 4.8–10.8)

## 2023-04-17 RX ORDER — POTASSIUM PHOSPHATE, MONOBASIC POTASSIUM PHOSPHATE, DIBASIC 236; 224 MG/ML; MG/ML
15 INJECTION, SOLUTION INTRAVENOUS ONCE
Refills: 0 | Status: COMPLETED | OUTPATIENT
Start: 2023-04-17 | End: 2023-04-17

## 2023-04-17 RX ADMIN — Medication 650 MILLIGRAM(S): at 00:00

## 2023-04-17 RX ADMIN — SODIUM CHLORIDE 125 MILLILITER(S): 9 INJECTION, SOLUTION INTRAVENOUS at 21:14

## 2023-04-17 RX ADMIN — Medication 63.75 MILLIMOLE(S): at 02:09

## 2023-04-17 RX ADMIN — Medication 650 MILLIGRAM(S): at 00:09

## 2023-04-17 RX ADMIN — Medication 650 MILLIGRAM(S): at 23:25

## 2023-04-17 RX ADMIN — ENOXAPARIN SODIUM 40 MILLIGRAM(S): 100 INJECTION SUBCUTANEOUS at 16:39

## 2023-04-17 RX ADMIN — Medication 650 MILLIGRAM(S): at 05:17

## 2023-04-17 RX ADMIN — Medication 650 MILLIGRAM(S): at 16:39

## 2023-04-17 RX ADMIN — PIPERACILLIN AND TAZOBACTAM 25 GRAM(S): 4; .5 INJECTION, POWDER, LYOPHILIZED, FOR SOLUTION INTRAVENOUS at 05:19

## 2023-04-17 RX ADMIN — SODIUM CHLORIDE 125 MILLILITER(S): 9 INJECTION, SOLUTION INTRAVENOUS at 12:32

## 2023-04-17 RX ADMIN — PIPERACILLIN AND TAZOBACTAM 25 GRAM(S): 4; .5 INJECTION, POWDER, LYOPHILIZED, FOR SOLUTION INTRAVENOUS at 21:14

## 2023-04-17 RX ADMIN — SODIUM CHLORIDE 125 MILLILITER(S): 9 INJECTION, SOLUTION INTRAVENOUS at 05:19

## 2023-04-17 RX ADMIN — Medication 650 MILLIGRAM(S): at 05:20

## 2023-04-17 RX ADMIN — Medication 650 MILLIGRAM(S): at 23:48

## 2023-04-17 RX ADMIN — Medication 650 MILLIGRAM(S): at 11:33

## 2023-04-17 RX ADMIN — PIPERACILLIN AND TAZOBACTAM 25 GRAM(S): 4; .5 INJECTION, POWDER, LYOPHILIZED, FOR SOLUTION INTRAVENOUS at 14:05

## 2023-04-17 NOTE — PROVIDER CONTACT NOTE (OTHER) - ACTION/TREATMENT ORDERED:
will order bolus. patient is already on IV abx. No other intervention at this time
no intervention at this time. Continue to monitor.

## 2023-04-17 NOTE — PROGRESS NOTE ADULT - ASSESSMENT
32yF now s/p Lap appy from an outside facility who presented with post op pain after having laparoscopic appendectomy. Admitted to surgical service for management.     PLAN:  - Tmax 101.6  - Persistent low grade tachycardia: 1L bolus given   - Continue CLD, IVF  - IV Zosyn  - Monitor WBC, consider repeat CT if continues to spike fevers/WBC uptrends   - Hemodynamic monitoring  - Strict I's/O's  - Ambulate as tolerated  - Incentive spirometry     [phone]

## 2023-04-17 NOTE — PROVIDER CONTACT NOTE (OTHER) - DATE AND TIME:
17-Apr-2023 17:28
17-Apr-2023 11:40
16-Apr-2023 16:57
I have personally seen and examined this patient.  I have fully participated in the care of this patient. I have reviewed all pertinent clinical information, including history, physical exam, plan and the Resident’s note and agree except as noted.

## 2023-04-18 ENCOUNTER — TRANSCRIPTION ENCOUNTER (OUTPATIENT)
Age: 33
End: 2023-04-18

## 2023-04-18 VITALS
HEART RATE: 104 BPM | DIASTOLIC BLOOD PRESSURE: 63 MMHG | OXYGEN SATURATION: 97 % | RESPIRATION RATE: 18 BRPM | TEMPERATURE: 100 F | SYSTOLIC BLOOD PRESSURE: 110 MMHG

## 2023-04-18 RX ORDER — SODIUM CHLORIDE 9 MG/ML
1000 INJECTION, SOLUTION INTRAVENOUS ONCE
Refills: 0 | Status: COMPLETED | OUTPATIENT
Start: 2023-04-18 | End: 2023-04-18

## 2023-04-18 RX ADMIN — Medication 650 MILLIGRAM(S): at 05:20

## 2023-04-18 RX ADMIN — POTASSIUM PHOSPHATE, MONOBASIC POTASSIUM PHOSPHATE, DIBASIC 62.5 MILLIMOLE(S): 236; 224 INJECTION, SOLUTION INTRAVENOUS at 00:34

## 2023-04-18 RX ADMIN — SODIUM CHLORIDE 1000 MILLILITER(S): 9 INJECTION, SOLUTION INTRAVENOUS at 00:55

## 2023-04-18 NOTE — DISCHARGE NOTE PROVIDER - NSDCCPCAREPLAN_GEN_ALL_CORE_FT
PRINCIPAL DISCHARGE DIAGNOSIS  Diagnosis: Fever  Assessment and Plan of Treatment:       SECONDARY DISCHARGE DIAGNOSES  Diagnosis: S/P appendectomy  Assessment and Plan of Treatment:

## 2023-04-18 NOTE — DISCHARGE NOTE NURSING/CASE MANAGEMENT/SOCIAL WORK - PATIENT PORTAL LINK FT
You can access the FollowMyHealth Patient Portal offered by [facility] by registering at the following website: [email or web address]. By joining [facility] FollowMyHealth portal, you will also be able to view your health information using other applications (apps) compatible with our system.

## 2023-04-18 NOTE — PROGRESS NOTE ADULT - SUBJECTIVE AND OBJECTIVE BOX
GENERAL SURGERY PROGRESS NOTE    Patient: NADIA ALMAGUER , 32y ([birth year])Female   MRN: [id number]  Location: [address] ([address]) 017 B  Visit: 04-15-23 Inpatient  Date: 04-18-23 @ 00:43    Hospital Day #: 4  Post-Op Day #: 8    Procedure/Dx/Injuries: s/p lap appy @ [facility]    Events of past 24 hours: tolerating regular diet without any nausea or vomiting. Ambulating. +BM, - gas. On Zosyn. Low grade fever of 100.5 , given Tylenol. Most recent temp 99.8 F. WBC 20.     PAST MEDICAL & SURGICAL HISTORY:  H/O appendicitis  History of laparoscopic appendectomy    Vitals:   T(F): 99.8 (04-17-23 @ 23:07), Max: 100.5 (04-17-23 @ 16:29)  HR: 110 (04-17-23 @ 20:38)  BP: 118/63 (04-17-23 @ 20:38)  RR: 18 (04-17-23 @ 20:38)  SpO2: 98% (04-17-23 @ 20:38)      Diet, Regular      Fluids:     I & O's:    04-16-23 @ 07:01  -  04-17-23 @ 07:00  --------------------------------------------------------  IN:    IV PiggyBack: 100 mL    Lactated Ringers: 1375 mL    Lactated Ringers Bolus: 1000 mL  Total IN: 2475 mL    OUT:    Voided (mL): 850 mL  Total OUT: 850 mL    Total NET: 1625 mL      PHYSICAL EXAM:  General: NAD, AAOx3, calm and cooperative  Cardiac: RRR S1, S2  Respiratory: Chest rise equal b/l , no labored breathing  Abdomen: Soft, non-distended, non-tender  Skin: Warm/dry, normal color, no jaundice  Incision/wound: healing well, dressings in place, clean, dry and intact    MEDICATIONS  (STANDING):  acetaminophen     Tablet .. 650 milliGRAM(s) Oral every 6 hours  enoxaparin Injectable 40 milliGRAM(s) SubCutaneous every 24 hours  piperacillin/tazobactam IVPB.. 3.375 Gram(s) IV Intermittent every 8 hours  polyethylene glycol 3350 17 Gram(s) Oral daily  senna 2 Tablet(s) Oral at bedtime    MEDICATIONS  (PRN):  famotidine    Tablet 20 milliGRAM(s) Oral every 12 hours PRN Dyspepsia  ketorolac   Injectable 15 milliGRAM(s) IV Push every 6 hours PRN Moderate Pain (4 - 6)  ondansetron Injectable 4 milliGRAM(s) IV Push every 6 hours PRN Nausea    DVT PROPHYLAXIS: enoxaparin Injectable 40 milliGRAM(s) SubCutaneous every 24 hours  ANTIBIOTICS:  piperacillin/tazobactam IVPB.. 3.375 Gram(s)      LAB/STUDIES:  Labs:                        8.1    20.22 )-----------( 400      ( 17 Apr 2023 20:22 )             25.7       Auto Neutrophil %: 84.4 % (04-17-23 @ 20:22)  Auto Immature Granulocyte %: 0.8 % (04-17-23 @ 20:22)  Auto Neutrophil %: 86.6 % (04-17-23 @ 12:22)  Auto Immature Granulocyte %: 1.0 % (04-17-23 @ 12:22)    04-17    137  |  101  |  <3<L>  ----------------------------<  136<H>  3.8   |  23  |  0.5<L>      Calcium, Total Serum: 8.3 mg/dL (04-17-23 @ 20:22)      LFTs:             x    | 0.7  | x        ------------------[x       ( 16 Apr 2023 06:30 )  x    | x    | x           Coags:     15.60  ----< 1.36    ( 16 Apr 2023 06:30 )     x          Culture - Blood (collected 15 Apr 2023 16:25)  Source: .Blood Blood-Peripheral  Preliminary Report (17 Apr 2023 03:02):    No growth to date.    Culture - Blood (collected 15 Apr 2023 16:25)  Source: .Blood Blood-Peripheral  Preliminary Report (17 Apr 2023 03:02):    No growth to date.    Culture - Urine (collected 15 Apr 2023 07:02)  Source: Clean Catch Clean Catch (Midstream)  Preliminary Report (17 Apr 2023 14:25):    50,000 - 99,000 CFU/mL Enterococcus faecalis    <10,000 CFU/ml Normal Urogenital brennen present  
GENERAL SURGERY PROGRESS NOTE    Patient: NADIA ALMAGUER , 32y ([birth year])Female   MRN: [id number]  Location: [facility] ED Hold 013 A  Visit: 04-15-23 Inpatient  Date: 23 @ 01:53    Hospital Day #: 2  Post-Op Day #: 6    Procedure/Dx/Injuries: Lap appy at [facility] 4/10    Events of past 24 hours: Patient presented for evaluation of possible foreign body which was demonstrated to be a suture staple. Patient febrile and tachycardic with elevating WBC. IVF started, bolus given, IV Tylenol given. Patient states pain has moved to RLQ.    PAST MEDICAL & SURGICAL HISTORY:  H/O appendicitis  History of laparoscopic appendectomy    Vitals:   T(F): 100.4 (04-15-23 @ 23:15), Max: 100.6 (04-15-23 @ 08:24)  HR: 107 (04-15-23 @ 23:15)  BP: 115/62 (04-15-23 @ 23:15)  RR: 18 (04-15-23 @ 23:15)  SpO2: 98% (04-15-23 @ 23:15)      Diet, NPO:   Except Medications      Fluids: lactated ringers.: Solution, 1000 milliLiter(s) infuse at 125 mL/Hr      I & O's:    Bowel Movement: : [] YES [x] NO  Flatus: : [] YES [x] NO    PHYSICAL EXAM:  General: NAD, AAOx3, calm and cooperative  HEENT: NCAT, CHRISTOPHER, EOMI, Trachea ML  Cardiac: Tachycardic, S1, S2, no Murmurs, rubs or gallops  Respiratory: Normal respiratory effort on RA  Abdomen: Distention. Mild tenderness RLQ. Soft, no rebound, no guarding.   Musculoskeletal: Mobile, compartments soft  Neuro: Sensation grossly intact and equal throughout, no focal deficits  Vascular: Extremities well perfused  Skin: Warm/dry, normal color, no jaundice  Incision/wound: healing well, dressings in place, clean, dry and intact    MEDICATIONS  (STANDING):  acetaminophen     Tablet .. 650 milliGRAM(s) Oral every 6 hours  enoxaparin Injectable 40 milliGRAM(s) SubCutaneous every 24 hours  lactated ringers. 1000 milliLiter(s) (125 mL/Hr) IV Continuous <Continuous>  piperacillin/tazobactam IVPB.. 3.375 Gram(s) IV Intermittent every 8 hours    MEDICATIONS  (PRN):  famotidine    Tablet 20 milliGRAM(s) Oral every 12 hours PRN Dyspepsia  ketorolac   Injectable 15 milliGRAM(s) IV Push every 6 hours PRN Moderate Pain (4 - 6)  ondansetron Injectable 4 milliGRAM(s) IV Push every 6 hours PRN Nausea      DVT PROPHYLAXIS: enoxaparin Injectable 40 milliGRAM(s) SubCutaneous every 24 hours    GI PROPHYLAXIS:   ANTICOAGULATION:   ANTIBIOTICS:  piperacillin/tazobactam IVPB.. 3.375 Gram(s)      LAB/STUDIES:  Labs:  CAPILLARY BLOOD GLUCOSE                        9.1    17.49 )-----------( 339      ( 15 Apr 2023 20:17 )             28.3       Auto Neutrophil %: 86.6 % (04-15-23 @ 20:17)  Auto Immature Granulocyte %: 0.3 % (04-15-23 @ 20:17)  Auto Neutrophil %: 82.1 % (04-15-23 @ 03:52)  Auto Immature Granulocyte %: 0.5 % (04-15-23 @ 03:52)    04-15    137  |  102  |  6<L>  ----------------------------<  106<H>  4.4   |  21  |  0.6<L>      Calcium, Total Serum: 8.8 mg/dL (04-15-23 @ 20:17)      LFTs:             6.9  | 0.4  | 14       ------------------[95      ( 15 Apr 2023 03:52 )  3.9  | x    | 21          Lipase:23     Amylase:x             Coags:      Urinalysis Basic - ( 15 Apr 2023 20:25 )    Color: Yellow / Appearance: Clear / S.031 / pH: x  Gluc: x / Ketone: Moderate  / Bili: Negative / Urobili: <2 mg/dL   Blood: x / Protein: 30 mg/dL / Nitrite: Negative   Leuk Esterase: Negative / RBC: 2 /HPF / WBC 1 /HPF   Sq Epi: x / Non Sq Epi: x / Bacteria: Negative      IMAGING:  < from: CT Abdomen and Pelvis w/ IV Cont (04.15.23 @ 06:17) >  IMPRESSION:    Post appendectomy withmetallic suture material at the region of the   cecum, likely corresponding to foreign body finding on outside report    Mesentery edema and fat stranding adjacent to cecum. No contained fluid   collection with wall enhancement to suggest abscess. Nointra-abdominal   free air. Findings suggestive of phlegmonous changes/severe inflammatory   changes.    --- End of Report ---  < end of copied text >    · Assessment	  32yF now s/p Lap appy from an outside facility who presents concern for post op pain. Physical exam findings, imaging, and labs as documented above.   CT scan reviewed, foreign body in question is the staple from the appendectomy    PLAN:  - Febrile: IV Tylenol given  - Tachycardic: 500cc bolus given   - NPO, IVF  - Zosyn  - Monitor bowel function  - Monitor WBC  - Hemodynamic monitoring    Green Surgery Spectra  [id number]
GENERAL SURGERY PROGRESS NOTE    Patient: NADIA ALMAGUER , 32y (88)Female   MRN: [id number]  Location: [address] (Back) [facility] B  Visit: 04-15-23 Inpatient  Date: 23 @ 00:23    Hospital Day #: 3  Post-Op Day #: 7    Procedure/Dx/Injuries: s/p laparoscopic appendectomy     Events of past 24 hours:  -bowel regimen added, now having bowel movements  -tolerating CLD  -Tmax 101.6, downtrending  -persistent low grade tachycardia, received 1L bolus   -hgb 9-->7.8    PAST MEDICAL & SURGICAL HISTORY:  H/O appendicitis  History of laparoscopic appendectomy    Vitals:   T(F): 100.7 (23 @ 21:03), Max: 101.6 (23 @ 16:45)  HR: 105 (23 @ 21:03)  BP: 115/63 (23 @ 21:03)  RR: 18 (23 @ 21:03)  SpO2: 98% (23 @ 21:03)    Diet, Clear Liquid    Fluids:     I & O's:    04-15-23 @ 07:01  -  23 @ 07:00  --------------------------------------------------------  IN:    Lactated Ringers: 1125 mL  Total IN: 1125 mL    OUT:  Total OUT: 0 mL    Total NET: 1125 mL    PHYSICAL EXAM:  General: NAD, AAOx3, calm and cooperative  Cardiac: RRR S1, S2  Respiratory: Normal respiratory effort  Abdomen: Soft, non-distended, minimally tender to palpation   Vascular: Pulses 2+ throughout, extremities well perfused  Skin: Warm/dry, normal color, no jaundice  Incision/wound: healing well, dressings in place, clean, dry and intact    MEDICATIONS  (STANDING):  acetaminophen     Tablet .. 650 milliGRAM(s) Oral every 6 hours  enoxaparin Injectable 40 milliGRAM(s) SubCutaneous every 24 hours  lactated ringers. 1000 milliLiter(s) (125 mL/Hr) IV Continuous <Continuous>  piperacillin/tazobactam IVPB.. 3.375 Gram(s) IV Intermittent every 8 hours  polyethylene glycol 3350 17 Gram(s) Oral daily  senna 2 Tablet(s) Oral at bedtime    MEDICATIONS  (PRN):  famotidine    Tablet 20 milliGRAM(s) Oral every 12 hours PRN Dyspepsia  ketorolac   Injectable 15 milliGRAM(s) IV Push every 6 hours PRN Moderate Pain (4 - 6)  ondansetron Injectable 4 milliGRAM(s) IV Push every 6 hours PRN Nausea    DVT PROPHYLAXIS: enoxaparin Injectable 40 milliGRAM(s) SubCutaneous every 24 hours    GI PROPHYLAXIS:   ANTICOAGULATION:   ANTIBIOTICS:  piperacillin/tazobactam IVPB.. 3.375 Gram(s)    LAB/STUDIES:  Labs:  CAPILLARY BLOOD GLUCOSE               7.8    17.15 )-----------( 356      ( 2023 21:20 )             24.8       Auto Neutrophil %: 81.9 % (23 @ 21:20)  Auto Immature Granulocyte %: 0.6 % (23 @ 21:20)        136  |  102  |  4<L>  ----------------------------<  124<H>  4.3   |  24  |  0.6<L>    Calcium, Total Serum: 8.1 mg/dL (23 @ 21:20)    LFTs:             x    | 0.7  | x        ------------------[x       ( 2023 06:30 )  x    | x    | x           Lipase:x      Amylase:x        Coags:     15.60  ----< 1.36    ( 2023 06:30 )     x        Urinalysis Basic - ( 15 Apr 2023 20:25 )    Color: Yellow / Appearance: Clear / S.031 / pH: x  Gluc: x / Ketone: Moderate  / Bili: Negative / Urobili: <2 mg/dL   Blood: x / Protein: 30 mg/dL / Nitrite: Negative   Leuk Esterase: Negative / RBC: 2 /HPF / WBC 1 /HPF   Sq Epi: x / Non Sq Epi: x / Bacteria: Negative

## 2023-04-18 NOTE — DISCHARGE NOTE NURSING/CASE MANAGEMENT/SOCIAL WORK - NSDCPEFALRISK_GEN_ALL_CORE
For information on Fall & Injury Prevention, visit: [email or web address] OR  [email or web address] OR  https://www.cdc.gov/steadi/patient.html

## 2023-04-18 NOTE — DISCHARGE NOTE PROVIDER - HOSPITAL COURSE
32 year old female who presented to the ED with complaints of abdominal pain after recently undergoing laparoscopic appendectomy on 4/10/23 at an outside facility. On workup, she was found to have phlegmonous/severe inflammatory changes and was admitted to the surgical service for IV antibiotic treatment and further management.     Patient's hospital course was significant for a rise in WBC to a maximum of 20, however she remained clinically stable. Her abdominal pain progressively improved and she was able to tolerate a clear and full liquid diet after which she was transitioned to a regular diet which she also tolerated well. She had bowel function, was able to void spontaneously, ambulate without issue, and was afebrile for >24hrs. She was seen by the surgical team and cleared for discharge on oral antibiotics with plan to follow up in outpatient clinic with Dr. Kim.

## 2023-04-18 NOTE — DISCHARGE NOTE PROVIDER - CARE PROVIDER_API CALL
Sae Kim)  Surgery  [address] [place] [address]  Phone: [phone]  Fax: [phone]  Follow Up Time:

## 2023-04-18 NOTE — PROGRESS NOTE ADULT - ASSESSMENT
32yF now s/p Lap appy from an outside facility who presented with post op pain after having laparoscopic appendectomy. Admitted to surgical service for management.     PLAN:  - Monitor vitals; Tmax 100.5  - Persistent low grade tachycardia: 1L bolus given   - Regular diet  - IV Zosyn  - Monitor WBC, consider repeat CT if continues to spike fevers/WBC uptrends   - Strict I's/O's  - Ambulate as tolerated  - Incentive spirometry     GREEN SURGERY SPECTRA: [id number]

## 2023-04-20 ENCOUNTER — INPATIENT (INPATIENT)
Facility: HOSPITAL | Age: 33
LOS: 4 days | Discharge: HOME HEALTH | DRG: 721 | End: 2023-04-25
Attending: SURGERY | Admitting: SURGERY
Payer: MEDICAID

## 2023-04-20 VITALS
HEIGHT: 63 IN | TEMPERATURE: 103 F | OXYGEN SATURATION: 95 % | HEART RATE: 120 BPM | DIASTOLIC BLOOD PRESSURE: 65 MMHG | SYSTOLIC BLOOD PRESSURE: 117 MMHG | WEIGHT: 250 LBS | RESPIRATION RATE: 14 BRPM
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
## 2023-04-20 DIAGNOSIS — K65.1 PERITONEAL ABSCESS: ICD-10-CM

## 2023-04-20 PROBLEM — Z87.19 PERSONAL HISTORY OF OTHER DISEASES OF THE DIGESTIVE SYSTEM: Chronic | Status: ACTIVE | Noted: 2023-04-15

## 2023-04-20 PROBLEM — Z90.49 ACQUIRED ABSENCE OF OTHER SPECIFIED PARTS OF DIGESTIVE TRACT: Chronic | Status: ACTIVE | Noted: 2023-04-15

## 2023-04-20 LAB
ALBUMIN SERPL ELPH-MCNC: 3.1 G/DL — LOW (ref 3.5–5.2)
ALBUMIN SERPL ELPH-MCNC: 3.2 G/DL — LOW (ref 3.5–5.2)
ALP SERPL-CCNC: 133 U/L — HIGH (ref 30–115)
ALP SERPL-CCNC: 145 U/L — HIGH (ref 30–115)
ALT FLD-CCNC: 17 U/L — SIGNIFICANT CHANGE UP (ref 0–41)
ALT FLD-CCNC: 20 U/L — SIGNIFICANT CHANGE UP (ref 0–41)
ANION GAP SERPL CALC-SCNC: 10 MMOL/L — SIGNIFICANT CHANGE UP (ref 7–14)
ANION GAP SERPL CALC-SCNC: 10 MMOL/L — SIGNIFICANT CHANGE UP (ref 7–14)
APPEARANCE UR: ABNORMAL
APTT BLD: 29.8 SEC — SIGNIFICANT CHANGE UP (ref 27–39.2)
AST SERPL-CCNC: 18 U/L — SIGNIFICANT CHANGE UP (ref 0–41)
AST SERPL-CCNC: 69 U/L — HIGH (ref 0–41)
BACTERIA # UR AUTO: NEGATIVE — SIGNIFICANT CHANGE UP
BASOPHILS # BLD AUTO: 0.05 K/UL — SIGNIFICANT CHANGE UP (ref 0–0.2)
BASOPHILS NFR BLD AUTO: 0.2 % — SIGNIFICANT CHANGE UP (ref 0–1)
BILIRUB SERPL-MCNC: 0.5 MG/DL — SIGNIFICANT CHANGE UP (ref 0.2–1.2)
BILIRUB SERPL-MCNC: 0.5 MG/DL — SIGNIFICANT CHANGE UP (ref 0.2–1.2)
BILIRUB UR-MCNC: NEGATIVE — SIGNIFICANT CHANGE UP
BUN SERPL-MCNC: 3 MG/DL — LOW (ref 10–20)
BUN SERPL-MCNC: 4 MG/DL — LOW (ref 10–20)
CALCIUM SERPL-MCNC: 8.4 MG/DL — SIGNIFICANT CHANGE UP (ref 8.4–10.4)
CALCIUM SERPL-MCNC: 8.4 MG/DL — SIGNIFICANT CHANGE UP (ref 8.4–10.5)
CHLORIDE SERPL-SCNC: 104 MMOL/L — SIGNIFICANT CHANGE UP (ref 98–110)
CHLORIDE SERPL-SCNC: 95 MMOL/L — LOW (ref 98–110)
CO2 SERPL-SCNC: 24 MMOL/L — SIGNIFICANT CHANGE UP (ref 17–32)
CO2 SERPL-SCNC: 25 MMOL/L — SIGNIFICANT CHANGE UP (ref 17–32)
COLOR SPEC: YELLOW — SIGNIFICANT CHANGE UP
CREAT SERPL-MCNC: 0.5 MG/DL — LOW (ref 0.7–1.5)
CREAT SERPL-MCNC: <0.5 MG/DL — LOW (ref 0.7–1.5)
DIFF PNL FLD: NEGATIVE — SIGNIFICANT CHANGE UP
EGFR: 126 ML/MIN/1.73M2 — SIGNIFICANT CHANGE UP
EGFR: 133 ML/MIN/1.73M2 — SIGNIFICANT CHANGE UP
EOSINOPHIL # BLD AUTO: 0.02 K/UL — SIGNIFICANT CHANGE UP (ref 0–0.7)
EOSINOPHIL NFR BLD AUTO: 0.1 % — SIGNIFICANT CHANGE UP (ref 0–8)
EPI CELLS # UR: 13 /HPF — HIGH (ref 0–5)
GLUCOSE SERPL-MCNC: 118 MG/DL — HIGH (ref 70–99)
GLUCOSE SERPL-MCNC: 130 MG/DL — HIGH (ref 70–99)
GLUCOSE UR QL: NEGATIVE — SIGNIFICANT CHANGE UP
HCG SERPL QL: NEGATIVE — SIGNIFICANT CHANGE UP
HCT VFR BLD CALC: 26.3 % — LOW (ref 37–47)
HGB BLD-MCNC: 8.3 G/DL — LOW (ref 12–16)
HYALINE CASTS # UR AUTO: 16 /LPF — HIGH (ref 0–7)
IMM GRANULOCYTES NFR BLD AUTO: 0.8 % — HIGH (ref 0.1–0.3)
INR BLD: 1.25 RATIO — SIGNIFICANT CHANGE UP (ref 0.65–1.3)
KETONES UR-MCNC: ABNORMAL
LEUKOCYTE ESTERASE UR-ACNC: NEGATIVE — SIGNIFICANT CHANGE UP
LYMPHOCYTES # BLD AUTO: 1.39 K/UL — SIGNIFICANT CHANGE UP (ref 1.2–3.4)
LYMPHOCYTES # BLD AUTO: 5.5 % — LOW (ref 20.5–51.1)
MCHC RBC-ENTMCNC: 24.8 PG — LOW (ref 27–31)
MCHC RBC-ENTMCNC: 31.6 G/DL — LOW (ref 32–37)
MCV RBC AUTO: 78.5 FL — LOW (ref 81–99)
MONOCYTES # BLD AUTO: 1.2 K/UL — HIGH (ref 0.1–0.6)
MONOCYTES NFR BLD AUTO: 4.7 % — SIGNIFICANT CHANGE UP (ref 1.7–9.3)
NEUTROPHILS # BLD AUTO: 22.46 K/UL — HIGH (ref 1.4–6.5)
NEUTROPHILS NFR BLD AUTO: 88.7 % — HIGH (ref 42.2–75.2)
NITRITE UR-MCNC: NEGATIVE — SIGNIFICANT CHANGE UP
NRBC # BLD: 0 /100 WBCS — SIGNIFICANT CHANGE UP (ref 0–0)
PH UR: 6.5 — SIGNIFICANT CHANGE UP (ref 5–8)
PLATELET # BLD AUTO: 412 K/UL — HIGH (ref 130–400)
PMV BLD: 10.7 FL — HIGH (ref 7.4–10.4)
POTASSIUM SERPL-MCNC: 3.8 MMOL/L — SIGNIFICANT CHANGE UP (ref 3.5–5)
POTASSIUM SERPL-MCNC: SIGNIFICANT CHANGE UP MMOL/L (ref 3.5–5)
POTASSIUM SERPL-SCNC: 3.8 MMOL/L — SIGNIFICANT CHANGE UP (ref 3.5–5)
POTASSIUM SERPL-SCNC: SIGNIFICANT CHANGE UP MMOL/L (ref 3.5–5)
PROT SERPL-MCNC: 6.4 G/DL — SIGNIFICANT CHANGE UP (ref 6–8)
PROT SERPL-MCNC: 7.3 G/DL — SIGNIFICANT CHANGE UP (ref 6–8)
PROT UR-MCNC: ABNORMAL
PROTHROM AB SERPL-ACNC: 14.4 SEC — HIGH (ref 9.95–12.87)
RBC # BLD: 3.35 M/UL — LOW (ref 4.2–5.4)
RBC # FLD: 15.9 % — HIGH (ref 11.5–14.5)
RBC CASTS # UR COMP ASSIST: 3 /HPF — SIGNIFICANT CHANGE UP (ref 0–4)
SODIUM SERPL-SCNC: 129 MMOL/L — LOW (ref 135–146)
SODIUM SERPL-SCNC: 139 MMOL/L — SIGNIFICANT CHANGE UP (ref 135–146)
SP GR SPEC: 1.02 — SIGNIFICANT CHANGE UP (ref 1.01–1.03)
UROBILINOGEN FLD QL: ABNORMAL
WBC # BLD: 25.31 K/UL — HIGH (ref 4.8–10.8)
WBC # FLD AUTO: 25.31 K/UL — HIGH (ref 4.8–10.8)
WBC UR QL: 9 /HPF — HIGH (ref 0–5)

## 2023-04-20 PROCEDURE — 87102 FUNGUS ISOLATION CULTURE: CPT

## 2023-04-20 PROCEDURE — 86900 BLOOD TYPING SEROLOGIC ABO: CPT

## 2023-04-20 PROCEDURE — 87077 CULTURE AEROBIC IDENTIFY: CPT

## 2023-04-20 PROCEDURE — C1769: CPT

## 2023-04-20 PROCEDURE — 99285 EMERGENCY DEPT VISIT HI MDM: CPT

## 2023-04-20 PROCEDURE — 86850 RBC ANTIBODY SCREEN: CPT

## 2023-04-20 PROCEDURE — 87070 CULTURE OTHR SPECIMN AEROBIC: CPT

## 2023-04-20 PROCEDURE — 36415 COLL VENOUS BLD VENIPUNCTURE: CPT

## 2023-04-20 PROCEDURE — 82570 ASSAY OF URINE CREATININE: CPT

## 2023-04-20 PROCEDURE — 87015 SPECIMEN INFECT AGNT CONCNTJ: CPT

## 2023-04-20 PROCEDURE — 84300 ASSAY OF URINE SODIUM: CPT

## 2023-04-20 PROCEDURE — 85610 PROTHROMBIN TIME: CPT

## 2023-04-20 PROCEDURE — 87116 MYCOBACTERIA CULTURE: CPT

## 2023-04-20 PROCEDURE — 83735 ASSAY OF MAGNESIUM: CPT

## 2023-04-20 PROCEDURE — 84540 ASSAY OF URINE/UREA-N: CPT

## 2023-04-20 PROCEDURE — 85025 COMPLETE CBC W/AUTO DIFF WBC: CPT

## 2023-04-20 PROCEDURE — 84100 ASSAY OF PHOSPHORUS: CPT

## 2023-04-20 PROCEDURE — 85652 RBC SED RATE AUTOMATED: CPT

## 2023-04-20 PROCEDURE — C1729: CPT

## 2023-04-20 PROCEDURE — 87075 CULTR BACTERIA EXCEPT BLOOD: CPT

## 2023-04-20 PROCEDURE — 49406 IMAGE CATH FLUID PERI/RETRO: CPT

## 2023-04-20 PROCEDURE — 80048 BASIC METABOLIC PNL TOTAL CA: CPT

## 2023-04-20 PROCEDURE — 84156 ASSAY OF PROTEIN URINE: CPT

## 2023-04-20 PROCEDURE — 86901 BLOOD TYPING SEROLOGIC RH(D): CPT

## 2023-04-20 PROCEDURE — 99153 MOD SED SAME PHYS/QHP EA: CPT

## 2023-04-20 PROCEDURE — 83935 ASSAY OF URINE OSMOLALITY: CPT

## 2023-04-20 PROCEDURE — 99152 MOD SED SAME PHYS/QHP 5/>YRS: CPT

## 2023-04-20 PROCEDURE — 87206 SMEAR FLUORESCENT/ACID STAI: CPT

## 2023-04-20 PROCEDURE — 87205 SMEAR GRAM STAIN: CPT

## 2023-04-20 PROCEDURE — 81001 URINALYSIS AUTO W/SCOPE: CPT

## 2023-04-20 PROCEDURE — 84133 ASSAY OF URINE POTASSIUM: CPT

## 2023-04-20 PROCEDURE — 74177 CT ABD & PELVIS W/CONTRAST: CPT | Mod: 26,MA

## 2023-04-20 PROCEDURE — 87186 SC STD MICRODIL/AGAR DIL: CPT

## 2023-04-20 PROCEDURE — 93010 ELECTROCARDIOGRAM REPORT: CPT

## 2023-04-20 PROCEDURE — 86140 C-REACTIVE PROTEIN: CPT

## 2023-04-20 RX ORDER — MORPHINE SULFATE 50 MG/1
4 CAPSULE, EXTENDED RELEASE ORAL ONCE
Refills: 0 | Status: DISCONTINUED | OUTPATIENT
Start: 2023-04-20 | End: 2023-04-20

## 2023-04-20 RX ORDER — ACETAMINOPHEN 500 MG
650 TABLET ORAL ONCE
Refills: 0 | Status: COMPLETED | OUTPATIENT
Start: 2023-04-20 | End: 2023-04-20

## 2023-04-20 RX ORDER — VANCOMYCIN HCL 1 G
1000 VIAL (EA) INTRAVENOUS ONCE
Refills: 0 | Status: COMPLETED | OUTPATIENT
Start: 2023-04-20 | End: 2023-04-20

## 2023-04-20 RX ORDER — DIATRIZOATE MEGLUMINE 180 MG/ML
30 INJECTION, SOLUTION INTRAVESICAL ONCE
Refills: 0 | Status: COMPLETED | OUTPATIENT
Start: 2023-04-20 | End: 2023-04-20

## 2023-04-20 RX ORDER — CEFEPIME 1 G/1
2000 INJECTION, POWDER, FOR SOLUTION INTRAMUSCULAR; INTRAVENOUS ONCE
Refills: 0 | Status: COMPLETED | OUTPATIENT
Start: 2023-04-20 | End: 2023-04-20

## 2023-04-20 RX ORDER — SODIUM CHLORIDE 9 MG/ML
1000 INJECTION, SOLUTION INTRAVENOUS
Refills: 0 | Status: DISCONTINUED | OUTPATIENT
Start: 2023-04-20 | End: 2023-04-22

## 2023-04-20 RX ORDER — PIPERACILLIN AND TAZOBACTAM 4; .5 G/20ML; G/20ML
3.38 INJECTION, POWDER, LYOPHILIZED, FOR SOLUTION INTRAVENOUS ONCE
Refills: 0 | Status: COMPLETED | OUTPATIENT
Start: 2023-04-21 | End: 2023-04-21

## 2023-04-20 RX ORDER — ENOXAPARIN SODIUM 100 MG/ML
40 INJECTION SUBCUTANEOUS EVERY 24 HOURS
Refills: 0 | Status: DISCONTINUED | OUTPATIENT
Start: 2023-04-20 | End: 2023-04-21

## 2023-04-20 RX ORDER — PIPERACILLIN AND TAZOBACTAM 4; .5 G/20ML; G/20ML
3.38 INJECTION, POWDER, LYOPHILIZED, FOR SOLUTION INTRAVENOUS EVERY 6 HOURS
Refills: 0 | Status: DISCONTINUED | OUTPATIENT
Start: 2023-04-21 | End: 2023-04-23

## 2023-04-20 RX ORDER — PIPERACILLIN AND TAZOBACTAM 4; .5 G/20ML; G/20ML
3.38 INJECTION, POWDER, LYOPHILIZED, FOR SOLUTION INTRAVENOUS ONCE
Refills: 0 | Status: COMPLETED | OUTPATIENT
Start: 2023-04-20 | End: 2023-04-20

## 2023-04-20 RX ORDER — OXYCODONE HYDROCHLORIDE 5 MG/1
5 TABLET ORAL EVERY 6 HOURS
Refills: 0 | Status: DISCONTINUED | OUTPATIENT
Start: 2023-04-20 | End: 2023-04-25

## 2023-04-20 RX ORDER — SODIUM CHLORIDE 9 MG/ML
2000 INJECTION, SOLUTION INTRAVENOUS ONCE
Refills: 0 | Status: COMPLETED | OUTPATIENT
Start: 2023-04-20 | End: 2023-04-20

## 2023-04-20 RX ORDER — ACETAMINOPHEN 500 MG
650 TABLET ORAL EVERY 6 HOURS
Refills: 0 | Status: DISCONTINUED | OUTPATIENT
Start: 2023-04-20 | End: 2023-04-25

## 2023-04-20 RX ORDER — ONDANSETRON 8 MG/1
4 TABLET, FILM COATED ORAL ONCE
Refills: 0 | Status: COMPLETED | OUTPATIENT
Start: 2023-04-20 | End: 2023-04-20

## 2023-04-20 RX ADMIN — ONDANSETRON 4 MILLIGRAM(S): 8 TABLET, FILM COATED ORAL at 13:52

## 2023-04-20 RX ADMIN — Medication 650 MILLIGRAM(S): at 22:03

## 2023-04-20 RX ADMIN — DIATRIZOATE MEGLUMINE 30 MILLILITER(S): 180 INJECTION, SOLUTION INTRAVESICAL at 14:10

## 2023-04-20 RX ADMIN — CEFEPIME 100 MILLIGRAM(S): 1 INJECTION, POWDER, FOR SOLUTION INTRAMUSCULAR; INTRAVENOUS at 15:24

## 2023-04-20 RX ADMIN — MORPHINE SULFATE 4 MILLIGRAM(S): 50 CAPSULE, EXTENDED RELEASE ORAL at 13:51

## 2023-04-20 RX ADMIN — SODIUM CHLORIDE 2000 MILLILITER(S): 9 INJECTION, SOLUTION INTRAVENOUS at 13:52

## 2023-04-20 RX ADMIN — PIPERACILLIN AND TAZOBACTAM 200 GRAM(S): 4; .5 INJECTION, POWDER, LYOPHILIZED, FOR SOLUTION INTRAVENOUS at 23:23

## 2023-04-20 RX ADMIN — Medication 250 MILLIGRAM(S): at 15:42

## 2023-04-20 RX ADMIN — Medication 650 MILLIGRAM(S): at 13:52

## 2023-04-20 RX ADMIN — OXYCODONE HYDROCHLORIDE 5 MILLIGRAM(S): 5 TABLET ORAL at 22:03

## 2023-04-20 RX ADMIN — SODIUM CHLORIDE 115 MILLILITER(S): 9 INJECTION, SOLUTION INTRAVENOUS at 22:05

## 2023-04-20 NOTE — ED ADULT TRIAGE NOTE - CHIEF COMPLAINT QUOTE
pt co vomiting diarrhea and abdominal pain recently admitted after appendectomy (4/10 at [facility]) and developed fevers.

## 2023-04-20 NOTE — ED PROVIDER NOTE - ATTENDING CONTRIBUTION TO CARE
32-year-old female with recent appendectomy 4/10 at [facility] with Dr. Katz presents for evaluation of fever, abdominal pain and multiple episodes of watery diarrhea and nonbloody nonbilious vomiting.  Patient admitted on 4/15 for similar symptoms with elevated white count, started on IV Zosyn and admitted for several days and discharged.  Patient reports pain is worsening and now with Tmax 103.  Denies any urinary complaints, cough, shortness of breath, chest pain, sore throat, dizziness or vaginal dc.    VITAL SIGNS: noted  CONSTITUTIONAL: Well-developed; well-nourished; in no acute distress  HEAD: Normocephalic; atraumatic  EYES: PERRL, EOM intact; conjunctiva and sclera clear  ENT: No nasal discharge; airway clear. MMM  NECK: Supple; non tender.    CARD: S1, S2 normal; no murmurs, gallops, or rubs. Regular rate and rhythm  RESP: CTAB/L, no wheezes, rales or rhonchi  ABD: Normal bowel sounds; soft; slight distended; mild lower abdominal tenderness; no rebound or guarding, no CVA tenderness. Incisions well healing.  EXT: Normal ROM. No calf tenderness or edema. Distal pulses intact  NEURO: Alert, oriented. Grossly unremarkable. No focal deficits  SKIN: Skin exam is warm and dry, no acute rash 32-year-old female with recent appendectomy 4/10 at [facility] with Dr. Katz presents for evaluation of fever, abdominal pain and multiple episodes of watery diarrhea and nonbloody nonbilious vomiting.  Patient admitted on 4/15 for similar symptoms with elevated white count, started on IV Zosyn and admitted for several days and discharged.  Patient reports pain is worsening and now with T max 103.  Denies any urinary complaints, cough, shortness of breath, chest pain, sore throat, dizziness or vaginal dc.    VITAL SIGNS: noted  CONSTITUTIONAL: Well-developed; well-nourished; in no acute distress  HEAD: Normocephalic; atraumatic  EYES: PERRL, EOM intact; conjunctiva and sclera clear  ENT: No nasal discharge; airway clear. MMM  NECK: Supple; non tender.    CARD: S1, S2 normal; no murmurs, gallops, or rubs. Regular rate and rhythm  RESP: CTAB/L, no wheezes, rales or rhonchi  ABD: Normal bowel sounds; soft; slight distended; mild lower abdominal tenderness; no rebound or guarding, no CVA tenderness. Incisions well healing.  EXT: Normal ROM. No calf tenderness or edema. Distal pulses intact  NEURO: Alert, oriented. Grossly unremarkable. No focal deficits  SKIN: Skin exam is warm and dry, no acute rash

## 2023-04-20 NOTE — ED PROVIDER NOTE - PROGRESS NOTE DETAILS
Surgery consulted for evaluation. Pt s/o to Dr. Canales to follow up imaging, repeat VS, f/u surgery consult, reassess and dispo.

## 2023-04-20 NOTE — ED PROVIDER NOTE - OBJECTIVE STATEMENT
32 y f pmh of appendicitis diagnosed in November, s/p appendectomy 10 days ago, pw abd pain, persistent throughout the surgery but worse for past two days, 9/10 no all/agg factors, +fever, + nausea, no diarrhea/hematochezia. Denies cp, sob, n/v/d, dysuria. Surgery done in [facility] by Dr. Mejia.

## 2023-04-20 NOTE — ED ADULT NURSE NOTE - CHIEF COMPLAINT QUOTE
pt co vomiting diarrhea and abdominal pain recently admitted after appendectomy (4/10 at [facility]) and developed fevers.

## 2023-04-20 NOTE — ED PROVIDER NOTE - ADMIT DISPOSITION PRESENT ON ADMISSION SEPSIS Q1 - RE-EVALUATED PATIENT FLUID AND VITAL SIGNS
No respiratory distress. No stridor, Lungs sounds clear with good aeration bilaterally.
I have re-evaluated the patient's fluid status and reviewed vital signs. Clinical perfusion assessment was performed.

## 2023-04-20 NOTE — CONSULT NOTE ADULT - SUBJECTIVE AND OBJECTIVE BOX
GENERAL SURGERY CONSULT NOTE    Patient: NADIA ALMAGUER , 32y (86)Female   MRN: [id number]  Location: [facility] ED  Visit: 23 Emergency  Date: 23 @ 16:17    HPI:  32yF w/ PMHx of appendectomy s/p lap appendectomy at [facility] on April 10, 2023, who presented to the ED with chief complaint of fevers. Patient reports that she was recently discharge on Tuesday with PO Augmentin. Patient reports that yesterday she started having episodes of fevers. The fever was accompanied with nausea, 2 episodes of vomiting and diarrhea. Patient reports she has been compliant with antibiotics, but symptoms are still persistent.     PAST MEDICAL & SURGICAL HISTORY:  H/O appendicitis      History of laparoscopic appendectomy          Home Medications:        VITALS:  T(F): 98.7 (23 @ 15:48), Max: 103 (23 @ 12:06)  HR: 104 (23 @ 15:48) (104 - 120)  BP: 118/62 (23 @ 15:48) (117/65 - 118/62)  RR: 18 (23 @ 15:48) (14 - 18)  SpO2: 97% (23 @ 15:48) (95% - 97%)    PHYSICAL EXAM:  General: NAD, AAOx3, calm and cooperative  Cardiac: RRR   Respiratory: CTAB, normal respiratory effort  Abdomen: Soft, non-distended, RLQ tenderness to palpation, no rebound, no guarding, clean post surgical wounds  Musculoskeletal:ROM intact, compartments soft  Skin: Warm/dry, normal color, no jaundice  Incision/wound: healing well, dressings in place, clean, dry and intact    MEDICATIONS  (STANDING):    MEDICATIONS  (PRN):      LAB/STUDIES:                        8.3    25.31 )-----------( 412      ( 2023 13:10 )             26.3         139  |  104  |  3<L>  ----------------------------<  130<H>  3.8   |  25  |  <0.5<L>    Ca    8.4      2023 15:04    TPro  6.4  /  Alb  3.1<L>  /  TBili  0.5  /  DBili  x   /  AST  18  /  ALT  17  /  AlkPhos  133<H>  04-20    PT/INR - ( 2023 13:10 )   PT: 14.40 sec;   INR: 1.25 ratio         PTT - ( 2023 13:10 )  PTT:29.8 sec  LIVER FUNCTIONS - ( 2023 15:04 )  Alb: 3.1 g/dL / Pro: 6.4 g/dL / ALK PHOS: 133 U/L / ALT: 17 U/L / AST: 18 U/L / GGT: x           Urinalysis Basic - ( 2023 13:10 )    Color: Yellow / Appearance: Slightly Turbid / S.023 / pH: x  Gluc: x / Ketone: Small  / Bili: Negative / Urobili: 3 mg/dL   Blood: x / Protein: 100 mg/dL / Nitrite: Negative   Leuk Esterase: Negative / RBC: 3 /HPF / WBC 9 /HPF   Sq Epi: x / Non Sq Epi: x / Bacteria: Negative                  IMAGING:      ACCESS DEVICES:  [ x] Peripheral IV  [ ] Central Venous Line	[ ] R	[ ] L	[ ] IJ	[ ] Fem	[ ] SC	Placed:   [ ] Arterial Line		[ ] R	[ ] L	[ ] Fem	[ ] Rad	[ ] Ax	Placed:   [ ] PICC:					[ ] Mediport  [ ] Urinary Catheter, Date Placed:

## 2023-04-20 NOTE — ED PROVIDER NOTE - CLINICAL SUMMARY MEDICAL DECISION MAKING FREE TEXT BOX
Patient with recent appendectomy, suspicious for metallic suture/foreign body from a recent CAT scan, with increasing abdominal pain, fever, and leukocytosis.  CT today shows development of multiple abscesses, largest is 12 cm in the right lower quadrant/pelvis.  Started on IV antibiotics and surgery consulted.  Patient will be admitted to the surgical service.  Any ordered labs and EKG were reviewed.  Any imaging was ordered and reviewed by me.  Appropriate medications for patient's presenting complaints were ordered and effects were reassessed.  Patient's records (prior hospital, ED visit, and/or nursing home notes if available) were reviewed.  Additional history was obtained from EMS, family, and/or PCP (where available).  Escalation to admission/observation was considered.   Patient requires inpatient hospitalization - monitored setting.

## 2023-04-20 NOTE — H&P ADULT - HISTORY OF PRESENT ILLNESS
32yF w/ PMHx of appendectomy s/p lap appendectomy at [facility] on April 10, 2023, who presented to the ED with chief complaint of fevers. Patient reports that she was recently discharge on Tuesday with PO Augmentin. Patient reports that yesterday she started having episodes of fevers. The fever was accompanied with nausea, 2 episodes of vomiting and diarrhea. Patient reports she has been compliant with antibiotics, but symptoms are still persistent.

## 2023-04-20 NOTE — ED ADULT NURSE NOTE - OBJECTIVE STATEMENT
Abdominal pain with nausea and vomiting since appendectomy on 4/10.  Admitted to hospital after that for fever then discharged.

## 2023-04-20 NOTE — ED PROVIDER NOTE - PHYSICAL EXAMINATION
CONSTITUTIONAL: NAD  SKIN: Warm dry  HEAD: NCAT  EYES: NL inspection  ENT: MMM  NECK: Supple; non tender.  CARD: RRR  RESP: CTAB  ABD: Diffusely ttp, no rebound  EXT: no pedal edema  NEURO: Grossly unremarkable  PSYCH: Cooperative, appropriate.

## 2023-04-20 NOTE — H&P ADULT - NSHPLABSRESULTS_GEN_ALL_CORE
Labs:  CAPILLARY BLOOD GLUCOSE                              8.3    25.31 )-----------( 412      ( 2023 13:10 )             26.3       Auto Neutrophil %: 88.7 % (23 @ 13:10)  Auto Immature Granulocyte %: 0.8 % (23 @ 13:10)        139  |  104  |  3<L>  ----------------------------<  130<H>  3.8   |  25  |  <0.5<L>      Calcium, Total Serum: 8.4 mg/dL (23 @ 15:04)      LFTs:             6.4  | 0.5  | 18       ------------------[133     ( 2023 15:04 )  3.1  | x    | 17          Lipase:x      Amylase:x             Coags:     14.40  ----< 1.25    ( 2023 13:10 )     29.8            Urinalysis Basic - ( 2023 13:10 )    Color: Yellow / Appearance: Slightly Turbid / S.023 / pH: x  Gluc: x / Ketone: Small  / Bili: Negative / Urobili: 3 mg/dL   Blood: x / Protein: 100 mg/dL / Nitrite: Negative   Leuk Esterase: Negative / RBC: 3 /HPF / WBC 9 /HPF   Sq Epi: x / Non Sq Epi: x / Bacteria: Negative        < from: CT Abdomen and Pelvis w/ Oral Cont and w/ IV Cont (23 @ 18:10) >    Compared to recent CT abdomen 4/15/2023,    Interval development of multiple fluid collections with locules of   air/abscesses withinthe right lower quadrant/pelvis largest measuring   approximately 12.4 x 5.6 cm.    Mildly distended loops of small bowel. Oral contrast reaches the splenic   flexure.    New/increased size multiple subcentimeter retroperitoneal lymph nodes   likely reactive    Additional findings are unchanged in this short interval follow-up CT.    < end of copied text >    < from: CT Abdomen and Pelvis w/ IV Cont (04.15.23 @ 06:17) >    Post appendectomy withmetallic suture material at the region of the   cecum, likely corresponding to foreign body finding on outside report    Mesentery edema and fat stranding adjacent to cecum. No contained fluid   collection with wall enhancement to suggest abscess. Nointra-abdominal   free air. Findings suggestive of phlegmonous changes/severe inflammatory   changes.      < end of copied text >

## 2023-04-20 NOTE — CONSULT NOTE ADULT - ASSESSMENT
ASSESSMENT:  32yF w/ PMHx of appendectomy s/p lap appendectomy at [facility] on April 10, 2023, who presented to the ED with chief complaint of fevers. Patient reports that she was recently discharge on Tuesday with PO Augmentin. Patient reports that yesterday she started having episodes of fevers. The fever was accompanied with nausea, 2 episodes of vomiting and diarrhea. Patient reports she has been compliant with antibiotics, but symptoms are still persistent. Physical exam findings, imaging, and labs as documented above.     PLAN:  -IV hydration  -Keep NPO  -IV ABX  -Pain control  -Stat labs w blood cultures  -CT A/P w PO and IV contrast      Above plan discussed with Attending Surgeon Dr. Kim  , patient, patient family, and Primary team  04-20-23 @ 16:17    CONSULT SPECTRA: [id number]

## 2023-04-20 NOTE — ED ADULT NURSE NOTE - NSFALLRSKUNASSIST_ED_ALL_ED
Subjective   Patient presented with 4 problems today  #1 he has got pain in the left groin area  #2 he has got a sore throat  #3 he has got a rash on the back of his neck  #4 he is got some lumps in the back of the head  Sore throat started 2 days ago  Pain swallowing  The pain in the groin is worse with coughing or movement  He plays basketball  Again started a couple of days ago  The rash of the back of the neck was noticed yesterday  Itchy at times  The bumps in the back of the head were noticed again recently  Not any bigger and they are not painful  Jostin Wheat is a 34 year old male who presents to clinic today for the following health issues:    HPI   Acute Illness   Acute illness concerns: sore throat/rash on back of neck  Onset: a couple days ago    Fever: no    Chills/Sweats: no    Headache (location?): YES- onset    Sinus Pressure:no    Conjunctivitis:  no    Ear Pain: no    Rhinorrhea: no    Congestion: no    Sore Throat: YES     Cough: YES    Wheeze: a little bit last night    Decreased Appetite: YES    Nausea: no    Vomiting: no    Diarrhea:  no    Dysuria/Freq.: no    Fatigue/Achiness: no    Sick/Strep Exposure: no     Therapies Tried and outcome: dayquil but that didn't seem to help        There is no problem list on file for this patient.    History reviewed. No pertinent surgical history.    Social History     Tobacco Use     Smoking status: Never Smoker     Smokeless tobacco: Never Used   Substance Use Topics     Alcohol use: Yes     History reviewed. No pertinent family history.          Reviewed and updated as needed this visit by Provider         Review of Systems   Constitutional: Negative for chills and fever.   HENT: Positive for sore throat. Negative for congestion, ear pain and hearing loss.    Eyes: Negative for pain and visual disturbance.   Respiratory: Negative for cough and shortness of breath.    Cardiovascular: Negative for chest pain, palpitations and peripheral edema.    Gastrointestinal: Negative for abdominal pain, constipation, diarrhea, heartburn, hematochezia and nausea.   Genitourinary: Negative for discharge, dysuria, frequency, genital sores, hematuria, impotence and urgency.   Musculoskeletal: Negative for arthralgias, joint swelling and myalgias.        Left groin pain   Skin: Negative for rash.   Neurological: Negative for dizziness, weakness, headaches and paresthesias.   Psychiatric/Behavioral: Negative for mood changes. The patient is not nervous/anxious.             Objective    There were no vitals taken for this visit.  There is no height or weight on file to calculate BMI.  Physical Exam  Vitals signs reviewed.   HENT:      Head: Normocephalic and atraumatic.      Comments: Erythematous throat no enlarged tonsils  Is got a couple of lymph nodes in the occipital area  These are subcentimeter and freely mobile     Mouth/Throat:      Comments: Erythematous throat no enlarged tonsils  Eyes:      Conjunctiva/sclera: Conjunctivae normal.      Pupils: Pupils are equal, round, and reactive to light.   Neck:      Musculoskeletal: Normal range of motion and neck supple.   Cardiovascular:      Rate and Rhythm: Normal rate and regular rhythm.   Pulmonary:      Effort: Pulmonary effort is normal.   Musculoskeletal:      Comments: Patient complaining of pain when I palpated the inguinal ligament area of left groin  Pain worse on flexion and extension   Skin:     General: Skin is warm.      Comments: Macular rash on the nape of the neck which is blanching     Neurological:      General: No focal deficit present.      Mental Status: He is alert and oriented to person, place, and time.   Psychiatric:         Mood and Affect: Mood normal.         Behavior: Behavior normal.            Results for orders placed or performed in visit on 02/07/20 (from the past 24 hour(s))   Strep, Rapid Screen   Result Value Ref Range    Specimen Description Throat     Rapid Strep A Screen        NEGATIVE: No Group A streptococcal antigen detected by immunoassay, await culture report.           Assessment & Plan     1. Acute pharyngitis, unspecified etiology  Most probably viral  Strep negative  Discussed about salt water gargling and OTC measures    2. Enlarged lymph nodes  Subcentimeter lymph nodes noted at the back of the neck in the occipital area  Reassured  Most probably reactive  Told to come back if they increase in size    3. Groin strain, left, initial encounter  Probably from musculoskeletal  Sprain  Discussed about limiting physical activity for the next couple of days    4.  Rash on the back of the neck  Looks like localized urticaria                 Return in about 4 weeks (around 3/6/2020).    Rigoberto Peralta MD  [facility]       no

## 2023-04-20 NOTE — H&P ADULT - ASSESSMENT
32yF w/ PMHx of appendectomy s/p lap appendectomy at [facility] on April 10, 2023, who presented to the ED with chief complaint of fevers. Patient reports that she was recently discharge on Tuesday with PO Augmentin. Patient reports that yesterday she started having episodes of fevers. The fever was accompanied with nausea, 2 episodes of vomiting and diarrhea. Patient reports she has been compliant with antibiotics, but symptoms are still persistent. Physical exam findings, imaging, and labs as documented above. CT scan demonstrating large fluid/air abscess collection    PLAN:  -Admit to surgical service under Dr. Chairez  -IR consult  -IV hydration  - NPO  -IV ABX  -Pain control  -Stat labs w blood cultures        Above plan discussed with Attending Surgeon Dr. Kim  , patient, patient family, and Primary team

## 2023-04-20 NOTE — ED ADULT NURSE NOTE - NS ED NOTE ABUSE SUSPICION NEGLECT YN
PA denied, [facility]    No

## 2023-04-20 NOTE — H&P ADULT - NSHPPHYSICALEXAM_GEN_ALL_CORE
PHYSICAL EXAM:  General: NAD, AAOx3, calm and cooperative  Cardiac: RRR   Respiratory: CTAB, normal respiratory effort  Abdomen: Soft, non-distended, RLQ tenderness to palpation, no rebound, no guarding, clean post surgical wounds  Musculoskeletal:ROM intact, compartments soft  Skin: Warm/dry, normal color, no jaundice  Incision/wound: healing well, dressings in place, clean, dry and intact

## 2023-04-21 LAB
ANION GAP SERPL CALC-SCNC: 12 MMOL/L — SIGNIFICANT CHANGE UP (ref 7–14)
ANION GAP SERPL CALC-SCNC: 13 MMOL/L — SIGNIFICANT CHANGE UP (ref 7–14)
APPEARANCE UR: CLEAR — SIGNIFICANT CHANGE UP
BACTERIA # UR AUTO: NEGATIVE — SIGNIFICANT CHANGE UP
BASOPHILS # BLD AUTO: 0.04 K/UL — SIGNIFICANT CHANGE UP (ref 0–0.2)
BASOPHILS # BLD AUTO: 0.05 K/UL — SIGNIFICANT CHANGE UP (ref 0–0.2)
BASOPHILS NFR BLD AUTO: 0.2 % — SIGNIFICANT CHANGE UP (ref 0–1)
BASOPHILS NFR BLD AUTO: 0.2 % — SIGNIFICANT CHANGE UP (ref 0–1)
BILIRUB UR-MCNC: NEGATIVE — SIGNIFICANT CHANGE UP
BLD GP AB SCN SERPL QL: SIGNIFICANT CHANGE UP
BUN SERPL-MCNC: 4 MG/DL — LOW (ref 10–20)
BUN SERPL-MCNC: 4 MG/DL — LOW (ref 10–20)
CALCIUM SERPL-MCNC: 7.7 MG/DL — LOW (ref 8.4–10.5)
CALCIUM SERPL-MCNC: 8 MG/DL — LOW (ref 8.4–10.5)
CHLORIDE SERPL-SCNC: 102 MMOL/L — SIGNIFICANT CHANGE UP (ref 98–110)
CHLORIDE SERPL-SCNC: 104 MMOL/L — SIGNIFICANT CHANGE UP (ref 98–110)
CO2 SERPL-SCNC: 23 MMOL/L — SIGNIFICANT CHANGE UP (ref 17–32)
CO2 SERPL-SCNC: 24 MMOL/L — SIGNIFICANT CHANGE UP (ref 17–32)
COLOR SPEC: YELLOW — SIGNIFICANT CHANGE UP
CREAT ?TM UR-MCNC: 105 MG/DL — SIGNIFICANT CHANGE UP
CREAT SERPL-MCNC: 0.5 MG/DL — LOW (ref 0.7–1.5)
CREAT SERPL-MCNC: <0.5 MG/DL — LOW (ref 0.7–1.5)
CULTURE RESULTS: SIGNIFICANT CHANGE UP
DIFF PNL FLD: NEGATIVE — SIGNIFICANT CHANGE UP
EGFR: 126 ML/MIN/1.73M2 — SIGNIFICANT CHANGE UP
EGFR: 133 ML/MIN/1.73M2 — SIGNIFICANT CHANGE UP
EOSINOPHIL # BLD AUTO: 0.02 K/UL — SIGNIFICANT CHANGE UP (ref 0–0.7)
EOSINOPHIL # BLD AUTO: 0.03 K/UL — SIGNIFICANT CHANGE UP (ref 0–0.7)
EOSINOPHIL NFR BLD AUTO: 0.1 % — SIGNIFICANT CHANGE UP (ref 0–8)
EOSINOPHIL NFR BLD AUTO: 0.1 % — SIGNIFICANT CHANGE UP (ref 0–8)
EPI CELLS # UR: 6 /HPF — HIGH (ref 0–5)
GLUCOSE SERPL-MCNC: 120 MG/DL — HIGH (ref 70–99)
GLUCOSE SERPL-MCNC: 89 MG/DL — SIGNIFICANT CHANGE UP (ref 70–99)
GLUCOSE UR QL: NEGATIVE — SIGNIFICANT CHANGE UP
GRAM STN FLD: SIGNIFICANT CHANGE UP
HCT VFR BLD CALC: 23.6 % — LOW (ref 37–47)
HCT VFR BLD CALC: 25.5 % — LOW (ref 37–47)
HGB BLD-MCNC: 7.6 G/DL — LOW (ref 12–16)
HGB BLD-MCNC: 7.9 G/DL — LOW (ref 12–16)
HYALINE CASTS # UR AUTO: 3 /LPF — SIGNIFICANT CHANGE UP (ref 0–7)
IMM GRANULOCYTES NFR BLD AUTO: 1 % — HIGH (ref 0.1–0.3)
IMM GRANULOCYTES NFR BLD AUTO: 1.1 % — HIGH (ref 0.1–0.3)
INR BLD: 1.36 RATIO — HIGH (ref 0.65–1.3)
KETONES UR-MCNC: ABNORMAL
LEUKOCYTE ESTERASE UR-ACNC: NEGATIVE — SIGNIFICANT CHANGE UP
LYMPHOCYTES # BLD AUTO: 1.5 K/UL — SIGNIFICANT CHANGE UP (ref 1.2–3.4)
LYMPHOCYTES # BLD AUTO: 10.2 % — LOW (ref 20.5–51.1)
LYMPHOCYTES # BLD AUTO: 2.23 K/UL — SIGNIFICANT CHANGE UP (ref 1.2–3.4)
LYMPHOCYTES # BLD AUTO: 6.5 % — LOW (ref 20.5–51.1)
MAGNESIUM SERPL-MCNC: 2.2 MG/DL — SIGNIFICANT CHANGE UP (ref 1.8–2.4)
MAGNESIUM SERPL-MCNC: 2.2 MG/DL — SIGNIFICANT CHANGE UP (ref 1.8–2.4)
MCHC RBC-ENTMCNC: 24.5 PG — LOW (ref 27–31)
MCHC RBC-ENTMCNC: 25.2 PG — LOW (ref 27–31)
MCHC RBC-ENTMCNC: 31 G/DL — LOW (ref 32–37)
MCHC RBC-ENTMCNC: 32.2 G/DL — SIGNIFICANT CHANGE UP (ref 32–37)
MCV RBC AUTO: 78.1 FL — LOW (ref 81–99)
MCV RBC AUTO: 78.9 FL — LOW (ref 81–99)
MONOCYTES # BLD AUTO: 1.03 K/UL — HIGH (ref 0.1–0.6)
MONOCYTES # BLD AUTO: 1.06 K/UL — HIGH (ref 0.1–0.6)
MONOCYTES NFR BLD AUTO: 4.6 % — SIGNIFICANT CHANGE UP (ref 1.7–9.3)
MONOCYTES NFR BLD AUTO: 4.7 % — SIGNIFICANT CHANGE UP (ref 1.7–9.3)
NEUTROPHILS # BLD AUTO: 18.22 K/UL — HIGH (ref 1.4–6.5)
NEUTROPHILS # BLD AUTO: 20.25 K/UL — HIGH (ref 1.4–6.5)
NEUTROPHILS NFR BLD AUTO: 83.7 % — HIGH (ref 42.2–75.2)
NEUTROPHILS NFR BLD AUTO: 87.6 % — HIGH (ref 42.2–75.2)
NITRITE UR-MCNC: NEGATIVE — SIGNIFICANT CHANGE UP
NRBC # BLD: 0 /100 WBCS — SIGNIFICANT CHANGE UP (ref 0–0)
NRBC # BLD: 0 /100 WBCS — SIGNIFICANT CHANGE UP (ref 0–0)
OSMOLALITY UR: 566 MOS/KG — SIGNIFICANT CHANGE UP (ref 50–1200)
PH UR: 7 — SIGNIFICANT CHANGE UP (ref 5–8)
PHOSPHATE SERPL-MCNC: 3.5 MG/DL — SIGNIFICANT CHANGE UP (ref 2.1–4.9)
PHOSPHATE SERPL-MCNC: 3.6 MG/DL — SIGNIFICANT CHANGE UP (ref 2.1–4.9)
PLATELET # BLD AUTO: 426 K/UL — HIGH (ref 130–400)
PLATELET # BLD AUTO: 440 K/UL — HIGH (ref 130–400)
PMV BLD: 9.7 FL — SIGNIFICANT CHANGE UP (ref 7.4–10.4)
PMV BLD: 9.8 FL — SIGNIFICANT CHANGE UP (ref 7.4–10.4)
POTASSIUM SERPL-MCNC: 3.5 MMOL/L — SIGNIFICANT CHANGE UP (ref 3.5–5)
POTASSIUM SERPL-MCNC: 4 MMOL/L — SIGNIFICANT CHANGE UP (ref 3.5–5)
POTASSIUM SERPL-SCNC: 3.5 MMOL/L — SIGNIFICANT CHANGE UP (ref 3.5–5)
POTASSIUM SERPL-SCNC: 4 MMOL/L — SIGNIFICANT CHANGE UP (ref 3.5–5)
POTASSIUM UR-SCNC: 17 MMOL/L — SIGNIFICANT CHANGE UP
PROT ?TM UR-MCNC: 47 MG/DLG/24H — SIGNIFICANT CHANGE UP
PROT UR-MCNC: ABNORMAL
PROT/CREAT UR-RTO: 0.4 RATIO — HIGH (ref 0–0.2)
PROTHROM AB SERPL-ACNC: 15.6 SEC — HIGH (ref 9.95–12.87)
RBC # BLD: 3.02 M/UL — LOW (ref 4.2–5.4)
RBC # BLD: 3.23 M/UL — LOW (ref 4.2–5.4)
RBC # FLD: 15.7 % — HIGH (ref 11.5–14.5)
RBC # FLD: 15.7 % — HIGH (ref 11.5–14.5)
RBC CASTS # UR COMP ASSIST: 4 /HPF — SIGNIFICANT CHANGE UP (ref 0–4)
SODIUM SERPL-SCNC: 138 MMOL/L — SIGNIFICANT CHANGE UP (ref 135–146)
SODIUM SERPL-SCNC: 140 MMOL/L — SIGNIFICANT CHANGE UP (ref 135–146)
SODIUM UR-SCNC: 170 MMOL/L — SIGNIFICANT CHANGE UP
SP GR SPEC: 1.02 — SIGNIFICANT CHANGE UP (ref 1.01–1.03)
SPECIMEN SOURCE: SIGNIFICANT CHANGE UP
UROBILINOGEN FLD QL: SIGNIFICANT CHANGE UP
UUN UR-MCNC: 392 MG/DL — SIGNIFICANT CHANGE UP
WBC # BLD: 21.79 K/UL — HIGH (ref 4.8–10.8)
WBC # BLD: 23.1 K/UL — HIGH (ref 4.8–10.8)
WBC # FLD AUTO: 21.79 K/UL — HIGH (ref 4.8–10.8)
WBC # FLD AUTO: 23.1 K/UL — HIGH (ref 4.8–10.8)
WBC UR QL: 3 /HPF — SIGNIFICANT CHANGE UP (ref 0–5)

## 2023-04-21 PROCEDURE — 99152 MOD SED SAME PHYS/QHP 5/>YRS: CPT

## 2023-04-21 PROCEDURE — 49406 IMAGE CATH FLUID PERI/RETRO: CPT

## 2023-04-21 RX ORDER — ENOXAPARIN SODIUM 100 MG/ML
40 INJECTION SUBCUTANEOUS EVERY 24 HOURS
Refills: 0 | Status: DISCONTINUED | OUTPATIENT
Start: 2023-04-21 | End: 2023-04-25

## 2023-04-21 RX ORDER — SODIUM CHLORIDE 9 MG/ML
500 INJECTION, SOLUTION INTRAVENOUS ONCE
Refills: 0 | Status: COMPLETED | OUTPATIENT
Start: 2023-04-21 | End: 2023-04-21

## 2023-04-21 RX ORDER — KETOROLAC TROMETHAMINE 30 MG/ML
15 SYRINGE (ML) INJECTION EVERY 6 HOURS
Refills: 0 | Status: DISCONTINUED | OUTPATIENT
Start: 2023-04-21 | End: 2023-04-25

## 2023-04-21 RX ADMIN — SODIUM CHLORIDE 115 MILLILITER(S): 9 INJECTION, SOLUTION INTRAVENOUS at 23:30

## 2023-04-21 RX ADMIN — SODIUM CHLORIDE 1000 MILLILITER(S): 9 INJECTION, SOLUTION INTRAVENOUS at 23:30

## 2023-04-21 RX ADMIN — PIPERACILLIN AND TAZOBACTAM 25 GRAM(S): 4; .5 INJECTION, POWDER, LYOPHILIZED, FOR SOLUTION INTRAVENOUS at 04:38

## 2023-04-21 RX ADMIN — SODIUM CHLORIDE 115 MILLILITER(S): 9 INJECTION, SOLUTION INTRAVENOUS at 15:26

## 2023-04-21 RX ADMIN — SODIUM CHLORIDE 115 MILLILITER(S): 9 INJECTION, SOLUTION INTRAVENOUS at 22:31

## 2023-04-21 RX ADMIN — Medication 650 MILLIGRAM(S): at 04:13

## 2023-04-21 RX ADMIN — PIPERACILLIN AND TAZOBACTAM 25 GRAM(S): 4; .5 INJECTION, POWDER, LYOPHILIZED, FOR SOLUTION INTRAVENOUS at 23:29

## 2023-04-21 RX ADMIN — PIPERACILLIN AND TAZOBACTAM 25 GRAM(S): 4; .5 INJECTION, POWDER, LYOPHILIZED, FOR SOLUTION INTRAVENOUS at 16:23

## 2023-04-21 RX ADMIN — SODIUM CHLORIDE 1000 MILLILITER(S): 9 INJECTION, SOLUTION INTRAVENOUS at 22:32

## 2023-04-21 RX ADMIN — ENOXAPARIN SODIUM 40 MILLIGRAM(S): 100 INJECTION SUBCUTANEOUS at 17:48

## 2023-04-21 RX ADMIN — Medication 650 MILLIGRAM(S): at 15:25

## 2023-04-21 RX ADMIN — Medication 650 MILLIGRAM(S): at 23:29

## 2023-04-21 RX ADMIN — SODIUM CHLORIDE 1000 MILLILITER(S): 9 INJECTION, SOLUTION INTRAVENOUS at 15:26

## 2023-04-21 RX ADMIN — OXYCODONE HYDROCHLORIDE 5 MILLIGRAM(S): 5 TABLET ORAL at 13:48

## 2023-04-21 NOTE — CONSULT NOTE ADULT - SUBJECTIVE AND OBJECTIVE BOX
INTERVENTIONAL RADIOLOGY CONSULT:     Procedure Requested: Abd abscess drain    HPI:  32yF w/ PMHx of appendectomy s/p lap appendectomy at [facility] on April 10, 2023, who presented to the ED with chief complaint of fevers. Patient reports that she was recently discharge on Tuesday with PO Augmentin. Patient reports that yesterday she started having episodes of fevers. The fever was accompanied with nausea, 2 episodes of vomiting and diarrhea.       PAST MEDICAL & SURGICAL HISTORY:  H/O appendicitis      History of laparoscopic appendectomy          MEDICATIONS  (STANDING):  acetaminophen     Tablet .. 650 milliGRAM(s) Oral every 6 hours  lactated ringers. 1000 milliLiter(s) (115 mL/Hr) IV Continuous <Continuous>  piperacillin/tazobactam IVPB.- 3.375 Gram(s) IV Intermittent once  piperacillin/tazobactam IVPB.. 3.375 Gram(s) IV Intermittent every 6 hours    MEDICATIONS  (PRN):  ketorolac   Injectable 15 milliGRAM(s) IV Push every 6 hours PRN Severe Pain (7 - 10)  oxyCODONE    IR 5 milliGRAM(s) Oral every 6 hours PRN Severe Pain (7 - 10)      Allergies    No Known Allergies    Intolerances    Physical Exam:   Vital Signs Last 24 Hrs  T(C): 37.4 (21 Apr 2023 10:25), Max: 39.4 (20 Apr 2023 12:06)  T(F): 99.3 (21 Apr 2023 10:25), Max: 103 (20 Apr 2023 12:06)  HR: 111 (21 Apr 2023 10:25) (89 - 120)  BP: 118/65 (21 Apr 2023 10:25) (108/55 - 122/70)  BP(mean): --  RR: 18 (21 Apr 2023 10:25) (14 - 20)  SpO2: 96% (21 Apr 2023 10:25) (95% - 100%)    Parameters below as of 21 Apr 2023 10:25  Patient On (Oxygen Delivery Method): room air        Pertinent labs:                      7.6    23.10 )-----------( 426      ( 21 Apr 2023 00:38 )             23.6       04-21    138  |  102  |  4<L>  ----------------------------<  120<H>  3.5   |  24  |  0.5<L>    Ca    8.0<L>      21 Apr 2023 00:38  Phos  3.5     04-21  Mg     2.2     04-21    TPro  6.4  /  Alb  3.1<L>  /  TBili  0.5  /  DBili  x   /  AST  18  /  ALT  17  /  AlkPhos  133<H>  04-20      PT/INR - ( 21 Apr 2023 00:38 )   PT: 15.60 sec;   INR: 1.36 ratio         PTT - ( 20 Apr 2023 13:10 )  PTT:29.8 sec    Radiology & Additional Studies:     Radiology imaging reviewed.       ASSESSMENT/ PLAN:     32yF w/ PMHx of appendectomy s/p lap appendectomy at [facility] on April 10, 2023, who presented to the ED with chief complaint of fevers. Patient reports that she was recently discharge on Tuesday with PO Augmentin. Patient reports that yesterday she started having episodes of fevers. The fever was accompanied with nausea, 2 episodes of vomiting and diarrhea. CT demonstrates a a large intraabdominal abscess.    - Drainage of intraabdominal abscess today 4/21.

## 2023-04-21 NOTE — PROVIDER CONTACT NOTE (OTHER) - SITUATION
pt's HR is elevated at 120, temp decreased to 100.1F
pt returned from IR, , temp 101.4F RN held 1200 Tylenol since pt was off unit, should I give 1800 dose early? also pt's sepsis screen is positive due to VS and WBC 23

## 2023-04-21 NOTE — PATIENT PROFILE ADULT - NSPROGENPREVTRANSFREQ
? etiology of Serratia bacteremia - f/u repeat BCx are negative thus far and repeat set via permacath  ct back to r/o abscess as she has a new lump on her right flank and US non specific   c/w abx for now, s/p cefepime to 7 days and now on day 1 of levaquin for a total of 6 more doses to complete 14 days total     also a prior smoker, quit prior to recent sequelae of events. ? COPD component - started duonebs for a few days, titrate down on O2 requirement, does not like the NC, has on a VM with 7L of O2; will continue to titrate down acetaminophen

## 2023-04-21 NOTE — PROVIDER CONTACT NOTE (OTHER) - ACTION/TREATMENT ORDERED:
Provider aware, no new orders
Provider stated to give Tylenol early and that she will get LR Bolus. provider aware of VS and positive sepsis screen

## 2023-04-21 NOTE — PROVIDER CONTACT NOTE (OTHER) - REASON
Pt presents with middle back pain worsening in past several days, pt with hx pancreatic CA and has been taking po morphine but it is no longer helping with pain.  Pt denies any new injury, denies any new N/V/D, denies urinary issues  
Nursing concerns
elevated HR

## 2023-04-22 LAB
ANION GAP SERPL CALC-SCNC: 11 MMOL/L — SIGNIFICANT CHANGE UP (ref 7–14)
BASOPHILS # BLD AUTO: 0.05 K/UL — SIGNIFICANT CHANGE UP (ref 0–0.2)
BASOPHILS NFR BLD AUTO: 0.3 % — SIGNIFICANT CHANGE UP (ref 0–1)
BUN SERPL-MCNC: 4 MG/DL — LOW (ref 10–20)
CALCIUM SERPL-MCNC: 7.9 MG/DL — LOW (ref 8.4–10.5)
CHLORIDE SERPL-SCNC: 99 MMOL/L — SIGNIFICANT CHANGE UP (ref 98–110)
CO2 SERPL-SCNC: 26 MMOL/L — SIGNIFICANT CHANGE UP (ref 17–32)
CREAT SERPL-MCNC: <0.5 MG/DL — LOW (ref 0.7–1.5)
EGFR: 133 ML/MIN/1.73M2 — SIGNIFICANT CHANGE UP
EOSINOPHIL # BLD AUTO: 0.11 K/UL — SIGNIFICANT CHANGE UP (ref 0–0.7)
EOSINOPHIL NFR BLD AUTO: 0.6 % — SIGNIFICANT CHANGE UP (ref 0–8)
GLUCOSE SERPL-MCNC: 138 MG/DL — HIGH (ref 70–99)
GRAM STN FLD: SIGNIFICANT CHANGE UP
HCT VFR BLD CALC: 24 % — LOW (ref 37–47)
HGB BLD-MCNC: 7.7 G/DL — LOW (ref 12–16)
IMM GRANULOCYTES NFR BLD AUTO: 1.6 % — HIGH (ref 0.1–0.3)
LYMPHOCYTES # BLD AUTO: 13.4 % — LOW (ref 20.5–51.1)
LYMPHOCYTES # BLD AUTO: 2.41 K/UL — SIGNIFICANT CHANGE UP (ref 1.2–3.4)
MAGNESIUM SERPL-MCNC: 2.2 MG/DL — SIGNIFICANT CHANGE UP (ref 1.8–2.4)
MCHC RBC-ENTMCNC: 24.9 PG — LOW (ref 27–31)
MCHC RBC-ENTMCNC: 32.1 G/DL — SIGNIFICANT CHANGE UP (ref 32–37)
MCV RBC AUTO: 77.7 FL — LOW (ref 81–99)
MONOCYTES # BLD AUTO: 0.82 K/UL — HIGH (ref 0.1–0.6)
MONOCYTES NFR BLD AUTO: 4.6 % — SIGNIFICANT CHANGE UP (ref 1.7–9.3)
NEUTROPHILS # BLD AUTO: 14.27 K/UL — HIGH (ref 1.4–6.5)
NEUTROPHILS NFR BLD AUTO: 79.5 % — HIGH (ref 42.2–75.2)
NIGHT BLUE STAIN TISS: SIGNIFICANT CHANGE UP
NRBC # BLD: 0 /100 WBCS — SIGNIFICANT CHANGE UP (ref 0–0)
PHOSPHATE SERPL-MCNC: 2.4 MG/DL — SIGNIFICANT CHANGE UP (ref 2.1–4.9)
PLATELET # BLD AUTO: 475 K/UL — HIGH (ref 130–400)
PMV BLD: 10 FL — SIGNIFICANT CHANGE UP (ref 7.4–10.4)
POTASSIUM SERPL-MCNC: 3.6 MMOL/L — SIGNIFICANT CHANGE UP (ref 3.5–5)
POTASSIUM SERPL-SCNC: 3.6 MMOL/L — SIGNIFICANT CHANGE UP (ref 3.5–5)
RBC # BLD: 3.09 M/UL — LOW (ref 4.2–5.4)
RBC # FLD: 15.7 % — HIGH (ref 11.5–14.5)
SODIUM SERPL-SCNC: 136 MMOL/L — SIGNIFICANT CHANGE UP (ref 135–146)
SPECIMEN SOURCE: SIGNIFICANT CHANGE UP
WBC # BLD: 17.95 K/UL — HIGH (ref 4.8–10.8)
WBC # FLD AUTO: 17.95 K/UL — HIGH (ref 4.8–10.8)

## 2023-04-22 RX ORDER — ONDANSETRON 8 MG/1
4 TABLET, FILM COATED ORAL EVERY 6 HOURS
Refills: 0 | Status: DISCONTINUED | OUTPATIENT
Start: 2023-04-22 | End: 2023-04-25

## 2023-04-22 RX ORDER — SODIUM CHLORIDE 9 MG/ML
1000 INJECTION, SOLUTION INTRAVENOUS
Refills: 0 | Status: DISCONTINUED | OUTPATIENT
Start: 2023-04-22 | End: 2023-04-23

## 2023-04-22 RX ORDER — POTASSIUM CHLORIDE 20 MEQ
20 PACKET (EA) ORAL
Refills: 0 | Status: DISCONTINUED | OUTPATIENT
Start: 2023-04-22 | End: 2023-04-23

## 2023-04-22 RX ORDER — POTASSIUM PHOSPHATE, MONOBASIC POTASSIUM PHOSPHATE, DIBASIC 236; 224 MG/ML; MG/ML
15 INJECTION, SOLUTION INTRAVENOUS ONCE
Refills: 0 | Status: COMPLETED | OUTPATIENT
Start: 2023-04-22 | End: 2023-04-22

## 2023-04-22 RX ADMIN — Medication 650 MILLIGRAM(S): at 17:56

## 2023-04-22 RX ADMIN — OXYCODONE HYDROCHLORIDE 5 MILLIGRAM(S): 5 TABLET ORAL at 23:25

## 2023-04-22 RX ADMIN — Medication 650 MILLIGRAM(S): at 00:46

## 2023-04-22 RX ADMIN — ENOXAPARIN SODIUM 40 MILLIGRAM(S): 100 INJECTION SUBCUTANEOUS at 18:38

## 2023-04-22 RX ADMIN — PIPERACILLIN AND TAZOBACTAM 25 GRAM(S): 4; .5 INJECTION, POWDER, LYOPHILIZED, FOR SOLUTION INTRAVENOUS at 11:47

## 2023-04-22 RX ADMIN — ONDANSETRON 4 MILLIGRAM(S): 8 TABLET, FILM COATED ORAL at 06:46

## 2023-04-22 RX ADMIN — Medication 650 MILLIGRAM(S): at 23:28

## 2023-04-22 RX ADMIN — Medication 650 MILLIGRAM(S): at 11:48

## 2023-04-22 RX ADMIN — Medication 650 MILLIGRAM(S): at 05:30

## 2023-04-22 RX ADMIN — PIPERACILLIN AND TAZOBACTAM 25 GRAM(S): 4; .5 INJECTION, POWDER, LYOPHILIZED, FOR SOLUTION INTRAVENOUS at 05:30

## 2023-04-22 RX ADMIN — Medication 650 MILLIGRAM(S): at 23:25

## 2023-04-22 RX ADMIN — PIPERACILLIN AND TAZOBACTAM 25 GRAM(S): 4; .5 INJECTION, POWDER, LYOPHILIZED, FOR SOLUTION INTRAVENOUS at 23:25

## 2023-04-22 RX ADMIN — SODIUM CHLORIDE 75 MILLILITER(S): 9 INJECTION, SOLUTION INTRAVENOUS at 14:01

## 2023-04-22 NOTE — PROCEDURE NOTE - NSNEEDLEGAUGE_GEN_A_CORE
Spoke with Ellen Waterman    Incision status: healed    Edema/Swellingno    Pain level and status: occasional    Use of pain medications: occasional; took 1 today    Bowels: good    Home Care Agency active: continues with home therapy for next 2 weeks    Outpatient therapy: n/a    Do you have all of your medications: yes    Changes in medications: no    Pt states doing well, no issues or concerns. Next f/u with surgeon is listed below. Agreed to more [address] f/u calls      Follow up appointments:    Future Appointments  Date Time Provider Maia Sadie   3/9/2018 11:00 AM Anayeli Menard MD [facility]
18

## 2023-04-22 NOTE — CONSULT NOTE ADULT - SUBJECTIVE AND OBJECTIVE BOX
NADIA ALMAGUER  32y, Female  Allergy: No Known Allergies      CHIEF COMPLAINT:   Abscess Collection s/p Appendectomy (2023 01:05)      LOS  2d    HPI  HPI:  32yF w/ PMHx of appendectomy s/p lap appendectomy at [facility] on April 10, 2023, who presented to the ED with chief complaint of fevers. Patient reports that she was recently discharge on Tuesday with PO Augmentin. Patient reports that yesterday she started having episodes of fevers. The fever was accompanied with nausea, 2 episodes of vomiting and diarrhea. Patient reports she has been compliant with antibiotics, but symptoms are still persistent.  (2023 19:51)      INFECTIOUS DISEASE HISTORY:  ID consulted for  Abscess Collection s/p Appendectomy   Admitted with fever T103 and leukocytosis  s/p IR drainage  "Dominant collection in the mid-pelvis above the bladder accessed with 5-Fr, 15cm Skater centesis system with CT-guidance, followed by uneventful placement of 12-Occitan pigtail catheter:  245cc of malodorous serosanguinous fluid aspirated, with appreciable clearing.  Specimens sent for cultures.  JANNA bulb placed.  Patient tolerated very well, without incident."    Fever post-IR drainage       body fluid   Numerous polymorphonuclear leukocytes seen per low power field    No organisms seen per oil power field     BCX NGTD x2    4/15 BCX NGTD x2    4/15 UCX 50,000 - 99,000 CFU/mL Enterococcus faecalis Ampicillin: S <=2    UA WBC 3    CTAP  Compared to recent CT abdomen 4/15/2023,  Interval development of multiple fluid collections with locules of air/abscesses withinthe right lower quadrant/pelvis largest measuring   approximately 12.4 x 5.6 cm.  Mildly distended loops of small bowel. Oral contrast reaches the splenic flexure.  New/increased size multiple subcentimeter retroperitoneal lymph nodes likely reactive  Additional findings are unchanged in this short interval follow-up CT.      cefepime   IVPB/Vanco 4/20 x1  piperacillin/tazobactam IVPB. -present      Currently ordered for:  piperacillin/tazobactam IVPB.. 3.375 Gram(s) IV Intermittent every 6 hours      PMH  PAST MEDICAL & SURGICAL HISTORY:  H/O appendicitis      History of laparoscopic appendectomy          FAMILY HISTORY  non-contributory     SOCIAL HISTORY  Social History:        ROS  ***    VITALS:  T(F): 99.4, Max: 101.4 (23 @ 14:43)  HR: 112  BP: 128/80  RR: 18Vital Signs Last 24 Hrs  T(C): 37.4 (2023 05:00), Max: 38.6 (2023 14:43)  T(F): 99.4 (2023 05:00), Max: 101.4 (2023 14:43)  HR: 112 (2023 05:00) (111 - 120)  BP: 128/80 (2023 05:00) (102/55 - 128/80)  BP(mean): --  RR: 18 (2023 05:00) (18 - 19)  SpO2: 96% (2023 05:00) (95% - 97%)    Parameters below as of 2023 05:00  Patient On (Oxygen Delivery Method): room air        PHYSICAL EXAM:  ***    TESTS & MEASUREMENTS:                        7.9    21.79 )-----------( 440      ( 2023 20:54 )             25.5     04-21    140  |  104  |  4<L>  ----------------------------<  89  4.0   |  23  |  <0.5<L>    Ca    7.7<L>      2023 20:54  Phos  3.6     -  Mg     2.2     -    TPro  6.4  /  Alb  3.1<L>  /  TBili  0.5  /  DBili  x   /  AST  18  /  ALT  17  /  AlkPhos  133<H>  04-20      LIVER FUNCTIONS - ( 2023 15:04 )  Alb: 3.1 g/dL / Pro: 6.4 g/dL / ALK PHOS: 133 U/L / ALT: 17 U/L / AST: 18 U/L / GGT: x           Urinalysis Basic - ( 2023 18:00 )    Color: Yellow / Appearance: Clear / S.020 / pH: x  Gluc: x / Ketone: Large  / Bili: Negative / Urobili: <2 mg/dL   Blood: x / Protein: 30 mg/dL / Nitrite: Negative   Leuk Esterase: Negative / RBC: 4 /HPF / WBC 3 /HPF   Sq Epi: x / Non Sq Epi: x / Bacteria: Negative        Culture - Body Fluid with Gram Stain (collected 23 @ 13:23)  Source: .Body Fluid None  Gram Stain (23 @ 23:42):    Numerous polymorphonuclear leukocytes seen per low power field    No organisms seen per oil power field    Culture - Urine (collected 23 @ 13:10)  Source: Clean Catch Clean Catch (Midstream)  Final Report (23 @ 18:51):    <10,000 CFU/mL Normal Urogenital Brennen    Culture - Blood (collected 23 @ 13:10)  Source: .Blood Blood-Peripheral  Preliminary Report (23 @ 23:01):    No growth to date.    Culture - Blood (collected 23 @ 13:10)  Source: .Blood Blood-Peripheral  Preliminary Report (23 @ 23:01):    No growth to date.    Culture - Blood (collected 04-15-23 @ 16:25)  Source: .Blood Blood-Peripheral  Final Report (23 @ 03:00):    No Growth Final    Culture - Blood (collected 04-15-23 @ 16:25)  Source: .Blood Blood-Peripheral  Final Report (23 @ 03:00):    No Growth Final    Culture - Urine (collected 04-15-23 @ 07:02)  Source: Clean Catch Clean Catch (Midstream)  Final Report (23 @ 13:42):    50,000 - 99,000 CFU/mL Enterococcus faecalis    <10,000 CFU/ml Normal Urogenital brennen present  Organism: Enterococcus faecalis (23 @ 13:42)  Organism: Enterococcus faecalis (23 @ 13:42)      Method Type: TIFFANY      -  Ampicillin: S <=2 Predicts results to ampicillin/sulbactam, amoxacillin-clavulanate and  piperacillin-tazobactam.      -  Ciprofloxacin: S <=1      -  Levofloxacin: S 1      -  Nitrofurantoin: S <=32 Should not be used to treat pyelonephritis.      -  Tetracycline: R >8      -  Vancomycin: S 2            INFECTIOUS DISEASES TESTING  COVID-19 PCR: NotDetec (04-15-23 @ 17:52)      INFLAMMATORY MARKERS      RADIOLOGY & ADDITIONAL TESTS:  I have personally reviewed the last Chest xray  CXR      CT  CT Abdomen and Pelvis w/ Oral Cont and w/ IV Cont:   ACC: [id number] EXAM:  CT ABDOMEN AND PELVIS OC IC   ORDERED BY: EILEEN CHRISTIAN     PROCEDURE DATE:  2023          INTERPRETATION:  CLINICAL STATEMENT: Abdominal pain.    TECHNIQUE: Contiguous axial CT images were obtained of the abdomen and   pelvis following administration of intravenous contrast.  Oral contrast   was administered. Reformatted images in the coronal and sagittal planes   were acquired.    COMPARISON CT: 4/15/2023    FINDINGS:    LOWER CHEST: No significant change    HEPATOBILIARY: No significant change.    SPLEEN: Stable resolution of trace perisplenic ascites    PANCREAS: No significant change.    ADRENAL GLANDS: No significant change    KIDNEYS: No significant change    ABDOMINOPELVIC NODES: New/increased size multiple subcentimeter   retroperitoneal lymph nodes likely reactive    PELVIC ORGANS: No significant change. Please note that the uterus is not   well delineated due to adjacent pathology.    PERITONEUM/MESENTERY/BOWEL: Interval development of multiple fluid   collections with locules of air/abscesses within the right lower   quadrant/pelvis largest measuring approximately 12.4 x 5.6 cm. Mildly   distended loops of small bowel. Oral contrast reaches the splenic   flexure. Additional findings are unchanged in this short interval   follow-up CT.    BONES/SOFT TISSUES: No evidence of acute osseous abnormality      IMPRESSION:  Compared to recent CT abdomen 4/15/2023,    Interval development of multiple fluid collections with locules of   air/abscesses withinthe right lower quadrant/pelvis largest measuring   approximately 12.4 x 5.6 cm.    Mildly distended loops of small bowel. Oral contrast reaches the splenic   flexure.    New/increased size multiple subcentimeter retroperitoneal lymph nodes   likely reactive    Additional findings are unchanged in this short interval follow-up CT.    --- End of Report ---            ATTILA HUNT MD; Attending Radiologist  This document has been electronically signed. 2023  6:39PM (23 @ 18:10)      CARDIOLOGY TESTING  12 Lead ECG:   Ventricular Rate 109 BPM    Atrial Rate 109 BPM    P-R Interval 124 ms    QRS Duration 66 ms    Q-T Interval 322 ms    QTC Calculation(Bazett) 433 ms    P Axis 65 degrees    R Axis 60 degrees    T Axis 46 degrees    Diagnosis Line Sinus tachycardia  Otherwise normal ECG    Confirmed by Ronny Hathaway ([id number]) on 2023 8:09:35 PM (23 @ 18:17)      MEDICATIONS  acetaminophen     Tablet .. 650 Oral every 6 hours  enoxaparin Injectable 40 SubCutaneous every 24 hours  lactated ringers. 1000 IV Continuous <Continuous>  piperacillin/tazobactam IVPB.. 3.375 IV Intermittent every 6 hours        ANTIBIOTICS:  piperacillin/tazobactam IVPB.. 3.375 Gram(s) IV Intermittent every 6 hours      ALLERGIES:  No Known Allergies         NADIA ALMAGUER  32y, Female  Allergy: No Known Allergies      CHIEF COMPLAINT:   Abscess Collection s/p Appendectomy (2023 01:05)      LOS  2d    HPI  HPI:  32yF w/ PMHx of appendectomy s/p lap appendectomy at [facility] on April 10, 2023, who presented to the ED with chief complaint of fevers. Patient reports that she was recently discharge on Tuesday with PO Augmentin. Patient reports that yesterday she started having episodes of fevers. The fever was accompanied with nausea, 2 episodes of vomiting and diarrhea. Patient reports she has been compliant with antibiotics, but symptoms are still persistent.  (2023 19:51)      INFECTIOUS DISEASE HISTORY:  ID consulted for  Abscess Collection s/p Appendectomy   Admitted with fever T103 and leukocytosis  s/p IR drainage  "Dominant collection in the mid-pelvis above the bladder accessed with 5-Fr, 15cm Skater centesis system with CT-guidance, followed by uneventful placement of 12-Kiswahili pigtail catheter:  245cc of malodorous serosanguinous fluid aspirated, with appreciable clearing.  Specimens sent for cultures.  JANNA bulb placed.  Patient tolerated very well, without incident."    Fever post-IR drainage       body fluid   Numerous polymorphonuclear leukocytes seen per low power field    No organisms seen per oil power field     BCX NGTD x2    4/15 BCX NGTD x2    4/15 UCX 50,000 - 99,000 CFU/mL Enterococcus faecalis Ampicillin: S <=2    UA WBC 3    CTAP  Compared to recent CT abdomen 4/15/2023,  Interval development of multiple fluid collections with locules of air/abscesses withinthe right lower quadrant/pelvis largest measuring   approximately 12.4 x 5.6 cm.  Mildly distended loops of small bowel. Oral contrast reaches the splenic flexure.  New/increased size multiple subcentimeter retroperitoneal lymph nodes likely reactive  Additional findings are unchanged in this short interval follow-up CT.      cefepime   IVPB/Vanco 4/20 x1  piperacillin/tazobactam IVPB. -present      Currently ordered for:  piperacillin/tazobactam IVPB.. 3.375 Gram(s) IV Intermittent every 6 hours      PMH  PAST MEDICAL & SURGICAL HISTORY:  H/O appendicitis      History of laparoscopic appendectomy          FAMILY HISTORY  non-contributory     SOCIAL HISTORY  Social History:        ROS  General: Denies rigors, nightsweats  HEENT: Denies headache, rhinorrhea, sore throat, eye pain  CV: Denies CP, palpitations  PULM: Denies wheezing, hemoptysis  GI: as noted above   : Denies discharge, hematuria  MSK: Denies arthralgias, myalgias  SKIN: Denies rash, lesions  NEURO: Denies paresthesias, weakness  PSYCH: Denies depression, anxiety     VITALS:  T(F): 99.4, Max: 101.4 (23 @ 14:43)  HR: 112  BP: 128/80  RR: 18Vital Signs Last 24 Hrs  T(C): 37.4 (2023 05:00), Max: 38.6 (2023 14:43)  T(F): 99.4 (2023 05:00), Max: 101.4 (2023 14:43)  HR: 112 (2023 05:00) (111 - 120)  BP: 128/80 (2023 05:00) (102/55 - 128/80)  BP(mean): --  RR: 18 (2023 05:00) (18 - 19)  SpO2: 96% (2023 05:00) (95% - 97%)    Parameters below as of 2023 05:00  Patient On (Oxygen Delivery Method): room air        PHYSICAL EXAM:  Gen: NAD, resting in bed obese  HEENT: Normocephalic, atraumatic  Neck: supple, no lymphadenopathy  CV: Regular rate & regular rhythm  Lungs: decreased BS at bases, no fremitus  Abdomen: Soft, BS present incision clean, no erythema/purulence JANNA sanguinous fluid  Ext: Warm, well perfused  Neuro: non focal, awake  Skin: no rash, no erythema  Lines: no phlebitis     TESTS & MEASUREMENTS:                        7.9    21.79 )-----------( 440      ( 2023 20:54 )             25.5         140  |  104  |  4<L>  ----------------------------<  89  4.0   |  23  |  <0.5<L>    Ca    7.7<L>      2023 20:54  Phos  3.6       Mg     2.2         TPro  6.4  /  Alb  3.1<L>  /  TBili  0.5  /  DBili  x   /  AST  18  /  ALT  17  /  AlkPhos  133<H>        LIVER FUNCTIONS - ( 2023 15:04 )  Alb: 3.1 g/dL / Pro: 6.4 g/dL / ALK PHOS: 133 U/L / ALT: 17 U/L / AST: 18 U/L / GGT: x           Urinalysis Basic - ( 2023 18:00 )    Color: Yellow / Appearance: Clear / S.020 / pH: x  Gluc: x / Ketone: Large  / Bili: Negative / Urobili: <2 mg/dL   Blood: x / Protein: 30 mg/dL / Nitrite: Negative   Leuk Esterase: Negative / RBC: 4 /HPF / WBC 3 /HPF   Sq Epi: x / Non Sq Epi: x / Bacteria: Negative        Culture - Body Fluid with Gram Stain (collected 23 @ 13:23)  Source: .Body Fluid None  Gram Stain (23 @ 23:42):    Numerous polymorphonuclear leukocytes seen per low power field    No organisms seen per oil power field    Culture - Urine (collected 23 @ 13:10)  Source: Clean Catch Clean Catch (Midstream)  Final Report (23 @ 18:51):    <10,000 CFU/mL Normal Urogenital Brennen    Culture - Blood (collected 23 @ 13:10)  Source: .Blood Blood-Peripheral  Preliminary Report (23 @ 23:01):    No growth to date.    Culture - Blood (collected 23 @ 13:10)  Source: .Blood Blood-Peripheral  Preliminary Report (23 @ 23:01):    No growth to date.    Culture - Blood (collected 04-15-23 @ 16:25)  Source: .Blood Blood-Peripheral  Final Report (23 @ 03:00):    No Growth Final    Culture - Blood (collected 04-15-23 @ 16:25)  Source: .Blood Blood-Peripheral  Final Report (23 @ 03:00):    No Growth Final    Culture - Urine (collected 04-15-23 @ 07:02)  Source: Clean Catch Clean Catch (Midstream)  Final Report (23 @ 13:42):    50,000 - 99,000 CFU/mL Enterococcus faecalis    <10,000 CFU/ml Normal Urogenital brennen present  Organism: Enterococcus faecalis (23 @ 13:42)  Organism: Enterococcus faecalis (23 @ 13:42)      Method Type: TIFFANY      -  Ampicillin: S <=2 Predicts results to ampicillin/sulbactam, amoxacillin-clavulanate and  piperacillin-tazobactam.      -  Ciprofloxacin: S <=1      -  Levofloxacin: S 1      -  Nitrofurantoin: S <=32 Should not be used to treat pyelonephritis.      -  Tetracycline: R >8      -  Vancomycin: S 2            INFECTIOUS DISEASES TESTING  COVID-19 PCR: NotDetec (04-15-23 @ 17:52)      INFLAMMATORY MARKERS      RADIOLOGY & ADDITIONAL TESTS:  I have personally reviewed the last Chest xray  CXR      CT  CT Abdomen and Pelvis w/ Oral Cont and w/ IV Cont:   ACC: [id number] EXAM:  CT ABDOMEN AND PELVIS OC IC   ORDERED BY: EILEEN CHRISTIAN     PROCEDURE DATE:  2023          INTERPRETATION:  CLINICAL STATEMENT: Abdominal pain.    TECHNIQUE: Contiguous axial CT images were obtained of the abdomen and   pelvis following administration of intravenous contrast.  Oral contrast   was administered. Reformatted images in the coronal and sagittal planes   were acquired.    COMPARISON CT: 4/15/2023    FINDINGS:    LOWER CHEST: No significant change    HEPATOBILIARY: No significant change.    SPLEEN: Stable resolution of trace perisplenic ascites    PANCREAS: No significant change.    ADRENAL GLANDS: No significant change    KIDNEYS: No significant change    ABDOMINOPELVIC NODES: New/increased size multiple subcentimeter   retroperitoneal lymph nodes likely reactive    PELVIC ORGANS: No significant change. Please note that the uterus is not   well delineated due to adjacent pathology.    PERITONEUM/MESENTERY/BOWEL: Interval development of multiple fluid   collections with locules of air/abscesses within the right lower   quadrant/pelvis largest measuring approximately 12.4 x 5.6 cm. Mildly   distended loops of small bowel. Oral contrast reaches the splenic   flexure. Additional findings are unchanged in this short interval   follow-up CT.    BONES/SOFT TISSUES: No evidence of acute osseous abnormality      IMPRESSION:  Compared to recent CT abdomen 4/15/2023,    Interval development of multiple fluid collections with locules of   air/abscesses withinthe right lower quadrant/pelvis largest measuring   approximately 12.4 x 5.6 cm.    Mildly distended loops of small bowel. Oral contrast reaches the splenic   flexure.    New/increased size multiple subcentimeter retroperitoneal lymph nodes   likely reactive    Additional findings are unchanged in this short interval follow-up CT.    --- End of Report ---            ATTILA HUNT MD; Attending Radiologist  This document has been electronically signed. 2023  6:39PM (23 @ 18:10)      CARDIOLOGY TESTING  12 Lead ECG:   Ventricular Rate 109 BPM    Atrial Rate 109 BPM    P-R Interval 124 ms    QRS Duration 66 ms    Q-T Interval 322 ms    QTC Calculation(Bazett) 433 ms    P Axis 65 degrees    R Axis 60 degrees    T Axis 46 degrees    Diagnosis Line Sinus tachycardia  Otherwise normal ECG    Confirmed by Ronny Hathaway ([id number]) on 2023 8:09:35 PM (23 @ 18:17)      MEDICATIONS  acetaminophen     Tablet .. 650 Oral every 6 hours  enoxaparin Injectable 40 SubCutaneous every 24 hours  lactated ringers. 1000 IV Continuous <Continuous>  piperacillin/tazobactam IVPB.. 3.375 IV Intermittent every 6 hours        ANTIBIOTICS:  piperacillin/tazobactam IVPB.. 3.375 Gram(s) IV Intermittent every 6 hours      ALLERGIES:  No Known Allergies

## 2023-04-22 NOTE — CONSULT NOTE ADULT - ASSESSMENT
ASSESSMENT  32yF w/ PMHx of appendectomy s/p lap appendectomy at [facility] on April 10, 2023, who presented to the ED with chief complaint of fevers. Patient reports that she was recently discharge on Tuesday with PO Augmentin.     IMPRESSION  #Fever post-IR drainage  #Intraabdominal abscess s/p appy 4/10 with Sepsis on admission T>101 P>90 WBC >12    s/p IR drainage 4/21 "Dominant collection in the mid-pelvis above the bladder accessed with 5-Fr, 15cm Skater centesis system with CT-guidance, followed by uneventful placement of 12-Cuban pigtail catheter:  245cc of malodorous serosanguinous fluid aspirated, with appreciable clearing.  Specimens sent for cultures.  JANNA bulb placed.  Patient tolerated very well, without incident."    4/21 body fluid   Numerous polymorphonuclear leukocytes seen per low power field    No organisms seen per oil power field    4/20 BCX NGTD x2    4/15 BCX NGTD x2    4/15 UCX 50,000 - 99,000 CFU/mL Enterococcus faecalis Ampicillin: S <=2    UA WBC 3    CTAP 4/20 Compared to recent CT abdomen 4/15/2023,  Interval development of multiple fluid collections with locules of air/abscesses withinthe right lower quadrant/pelvis largest measuring   approximately 12.4 x 5.6 cm.  Mildly distended loops of small bowel. Oral contrast reaches the splenic flexure.  New/increased size multiple subcentimeter retroperitoneal lymph nodes likely reactive  Additional findings are unchanged in this short interval follow-up CT.  #Morbid Obesity BMI (kg/m2): 44.3, 44.3  #Abx allergy: No Known Allergies    Creatinine, Serum: <0.5 (04-21-23 @ 20:54)    Weight (kg): 113.4 (04-21-23 @ 10:18)    RECOMMENDATIONS  This is an incomplete consult note. All final recommendations to follow after interview and examination of the patient. Please follow recommendations noted below.  - Febrile overnight, please send BCX    If any questions, please call or send a message on [employer] Teams  Please continue to update ID with any pertinent new laboratory or radiographic findings  #[id number]   ASSESSMENT  32yF w/ PMHx of appendectomy s/p lap appendectomy at [facility] on April 10, 2023, who presented to the ED with chief complaint of fevers. Patient reports that she was recently discharge on Tuesday with PO Augmentin.     IMPRESSION  #Fever post-IR drainage  #Intraabdominal abscess s/p appy 4/10 with Sepsis on admission T>101 P>90 WBC >12    s/p IR drainage 4/21 "Dominant collection in the mid-pelvis above the bladder accessed with 5-Fr, 15cm Skater centesis system with CT-guidance, followed by uneventful placement of 12-Indonesian pigtail catheter:  245cc of malodorous serosanguinous fluid aspirated, with appreciable clearing.  Specimens sent for cultures.  JANNA bulb placed.  Patient tolerated very well, without incident."    4/21 body fluid   Numerous polymorphonuclear leukocytes seen per low power field    No organisms seen per oil power field    4/20 BCX NGTD x2    4/15 BCX NGTD x2    4/15 UCX 50,000 - 99,000 CFU/mL Enterococcus faecalis Ampicillin: S <=2    UA WBC 3    CTAP 4/20 Compared to recent CT abdomen 4/15/2023,  Interval development of multiple fluid collections with locules of air/abscesses withinthe right lower quadrant/pelvis largest measuring   approximately 12.4 x 5.6 cm.  Mildly distended loops of small bowel. Oral contrast reaches the splenic flexure.  New/increased size multiple subcentimeter retroperitoneal lymph nodes likely reactive  Additional findings are unchanged in this short interval follow-up CT.  #Morbid Obesity BMI (kg/m2): 44.3, 44.3  #Abx allergy: No Known Allergies    Creatinine, Serum: <0.5 (04-21-23 @ 20:54)    Weight (kg): 113.4 (04-21-23 @ 10:18)    RECOMMENDATIONS  - Febrile overnight, please send BCX  - f/u IR cx  - Anticipate D/C with midline and IV antibiotics regimen pending CX  - SEND ESR, CRP  - CHANGE to zosyn 3.375 q8h IV extended infusion    If any questions, please call or send a message on [employer] Teams  Please continue to update ID with any pertinent new laboratory or radiographic findings  #[id number]

## 2023-04-22 NOTE — PROCEDURE NOTE - NSPROCDETAILS_GEN_ALL_CORE
sterile dressing applied/sterile technique, catheter placed/supine position/Trendelenburg position/ultrasound guidance

## 2023-04-23 LAB
-  AMIKACIN: SIGNIFICANT CHANGE UP
-  AMIKACIN: SIGNIFICANT CHANGE UP
-  AMOXICILLIN/CLAVULANIC ACID: SIGNIFICANT CHANGE UP
-  AMOXICILLIN/CLAVULANIC ACID: SIGNIFICANT CHANGE UP
-  AMPICILLIN/SULBACTAM: SIGNIFICANT CHANGE UP
-  AMPICILLIN/SULBACTAM: SIGNIFICANT CHANGE UP
-  AMPICILLIN: SIGNIFICANT CHANGE UP
-  AMPICILLIN: SIGNIFICANT CHANGE UP
-  AZTREONAM: SIGNIFICANT CHANGE UP
-  AZTREONAM: SIGNIFICANT CHANGE UP
-  CEFAZOLIN: SIGNIFICANT CHANGE UP
-  CEFAZOLIN: SIGNIFICANT CHANGE UP
-  CEFEPIME: SIGNIFICANT CHANGE UP
-  CEFEPIME: SIGNIFICANT CHANGE UP
-  CEFOXITIN: SIGNIFICANT CHANGE UP
-  CEFOXITIN: SIGNIFICANT CHANGE UP
-  CEFTRIAXONE: SIGNIFICANT CHANGE UP
-  CEFTRIAXONE: SIGNIFICANT CHANGE UP
-  CIPROFLOXACIN: SIGNIFICANT CHANGE UP
-  CIPROFLOXACIN: SIGNIFICANT CHANGE UP
-  ERTAPENEM: SIGNIFICANT CHANGE UP
-  ERTAPENEM: SIGNIFICANT CHANGE UP
-  GENTAMICIN: SIGNIFICANT CHANGE UP
-  GENTAMICIN: SIGNIFICANT CHANGE UP
-  IMIPENEM: SIGNIFICANT CHANGE UP
-  IMIPENEM: SIGNIFICANT CHANGE UP
-  LEVOFLOXACIN: SIGNIFICANT CHANGE UP
-  LEVOFLOXACIN: SIGNIFICANT CHANGE UP
-  MEROPENEM: SIGNIFICANT CHANGE UP
-  MEROPENEM: SIGNIFICANT CHANGE UP
-  PIPERACILLIN/TAZOBACTAM: SIGNIFICANT CHANGE UP
-  PIPERACILLIN/TAZOBACTAM: SIGNIFICANT CHANGE UP
-  TOBRAMYCIN: SIGNIFICANT CHANGE UP
-  TOBRAMYCIN: SIGNIFICANT CHANGE UP
-  TRIMETHOPRIM/SULFAMETHOXAZOLE: SIGNIFICANT CHANGE UP
-  TRIMETHOPRIM/SULFAMETHOXAZOLE: SIGNIFICANT CHANGE UP
HCT VFR BLD CALC: 24.7 % — LOW (ref 37–47)
HGB BLD-MCNC: 7.6 G/DL — LOW (ref 12–16)
MCHC RBC-ENTMCNC: 24.1 PG — LOW (ref 27–31)
MCHC RBC-ENTMCNC: 30.8 G/DL — LOW (ref 32–37)
MCV RBC AUTO: 78.4 FL — LOW (ref 81–99)
METHOD TYPE: SIGNIFICANT CHANGE UP
METHOD TYPE: SIGNIFICANT CHANGE UP
PLATELET # BLD AUTO: 515 K/UL — HIGH (ref 130–400)
PMV BLD: 10.4 FL — SIGNIFICANT CHANGE UP (ref 7.4–10.4)
RBC # BLD: 3.15 M/UL — LOW (ref 4.2–5.4)
RBC # FLD: 15.9 % — HIGH (ref 11.5–14.5)
WBC # BLD: 13.55 K/UL — HIGH (ref 4.8–10.8)
WBC # FLD AUTO: 13.55 K/UL — HIGH (ref 4.8–10.8)

## 2023-04-23 RX ORDER — PIPERACILLIN AND TAZOBACTAM 4; .5 G/20ML; G/20ML
3.38 INJECTION, POWDER, LYOPHILIZED, FOR SOLUTION INTRAVENOUS EVERY 8 HOURS
Refills: 0 | Status: DISCONTINUED | OUTPATIENT
Start: 2023-04-23 | End: 2023-04-25

## 2023-04-23 RX ORDER — SODIUM CHLORIDE 9 MG/ML
1000 INJECTION, SOLUTION INTRAVENOUS
Refills: 0 | Status: DISCONTINUED | OUTPATIENT
Start: 2023-04-23 | End: 2023-04-24

## 2023-04-23 RX ORDER — PROCHLORPERAZINE MALEATE 5 MG
2.5 TABLET ORAL ONCE
Refills: 0 | Status: COMPLETED | OUTPATIENT
Start: 2023-04-23 | End: 2023-04-23

## 2023-04-23 RX ORDER — POTASSIUM CHLORIDE 20 MEQ
20 PACKET (EA) ORAL
Refills: 0 | Status: COMPLETED | OUTPATIENT
Start: 2023-04-23 | End: 2023-04-23

## 2023-04-23 RX ADMIN — OXYCODONE HYDROCHLORIDE 5 MILLIGRAM(S): 5 TABLET ORAL at 18:19

## 2023-04-23 RX ADMIN — ONDANSETRON 4 MILLIGRAM(S): 8 TABLET, FILM COATED ORAL at 18:19

## 2023-04-23 RX ADMIN — ENOXAPARIN SODIUM 40 MILLIGRAM(S): 100 INJECTION SUBCUTANEOUS at 17:57

## 2023-04-23 RX ADMIN — Medication 650 MILLIGRAM(S): at 12:30

## 2023-04-23 RX ADMIN — Medication 20 MILLIEQUIVALENT(S): at 05:58

## 2023-04-23 RX ADMIN — Medication 15 MILLIGRAM(S): at 04:32

## 2023-04-23 RX ADMIN — Medication 20 MILLIEQUIVALENT(S): at 02:11

## 2023-04-23 RX ADMIN — Medication 650 MILLIGRAM(S): at 18:46

## 2023-04-23 RX ADMIN — OXYCODONE HYDROCHLORIDE 5 MILLIGRAM(S): 5 TABLET ORAL at 18:46

## 2023-04-23 RX ADMIN — Medication 15 MILLIGRAM(S): at 14:50

## 2023-04-23 RX ADMIN — Medication 15 MILLIGRAM(S): at 04:02

## 2023-04-23 RX ADMIN — OXYCODONE HYDROCHLORIDE 5 MILLIGRAM(S): 5 TABLET ORAL at 01:02

## 2023-04-23 RX ADMIN — PIPERACILLIN AND TAZOBACTAM 25 GRAM(S): 4; .5 INJECTION, POWDER, LYOPHILIZED, FOR SOLUTION INTRAVENOUS at 17:56

## 2023-04-23 RX ADMIN — PIPERACILLIN AND TAZOBACTAM 25 GRAM(S): 4; .5 INJECTION, POWDER, LYOPHILIZED, FOR SOLUTION INTRAVENOUS at 05:58

## 2023-04-23 RX ADMIN — Medication 15 MILLIGRAM(S): at 14:16

## 2023-04-23 RX ADMIN — PIPERACILLIN AND TAZOBACTAM 25 GRAM(S): 4; .5 INJECTION, POWDER, LYOPHILIZED, FOR SOLUTION INTRAVENOUS at 11:27

## 2023-04-23 RX ADMIN — Medication 2.5 MILLIGRAM(S): at 22:07

## 2023-04-23 RX ADMIN — SODIUM CHLORIDE 75 MILLILITER(S): 9 INJECTION, SOLUTION INTRAVENOUS at 05:59

## 2023-04-23 RX ADMIN — Medication 650 MILLIGRAM(S): at 11:27

## 2023-04-23 RX ADMIN — Medication 650 MILLIGRAM(S): at 17:57

## 2023-04-23 RX ADMIN — POTASSIUM PHOSPHATE, MONOBASIC POTASSIUM PHOSPHATE, DIBASIC 62.5 MILLIMOLE(S): 236; 224 INJECTION, SOLUTION INTRAVENOUS at 01:37

## 2023-04-23 RX ADMIN — Medication 650 MILLIGRAM(S): at 23:39

## 2023-04-23 NOTE — PROGRESS NOTE ADULT - ASSESSMENT
ASSESSMENT  32yF w/ PMHx of appendectomy s/p lap appendectomy at [facility] on April 10, 2023, who presented to the ED with chief complaint of fevers. Patient reports that she was recently discharge on Tuesday with PO Augmentin.     IMPRESSION  #Fever post-IR drainage  #Intraabdominal abscess s/p appy 4/10 with Sepsis on admission T>101 P>90 WBC >12    s/p IR drainage 4/21 "Dominant collection in the mid-pelvis above the bladder accessed with 5-Fr, 15cm Skater centesis system with CT-guidance, followed by uneventful placement of 12-Somali pigtail catheter:  245cc of malodorous serosanguinous fluid aspirated, with appreciable clearing.  Specimens sent for cultures.  JANNA bulb placed.  Patient tolerated very well, without incident."    4/21 body fluid    Numerous Escherichia coli    4/20 BCX NGTD x2    4/15 BCX NGTD x2    4/15 UCX 50,000 - 99,000 CFU/mL Enterococcus faecalis Ampicillin: S <=2    UA WBC 3    CTAP 4/20 Compared to recent CT abdomen 4/15/2023,  Interval development of multiple fluid collections with locules of air/abscesses withinthe right lower quadrant/pelvis largest measuring   approximately 12.4 x 5.6 cm.  Mildly distended loops of small bowel. Oral contrast reaches the splenic flexure.  New/increased size multiple subcentimeter retroperitoneal lymph nodes likely reactive  Additional findings are unchanged in this short interval follow-up CT.  #Morbid Obesity BMI (kg/m2): 44.3, 44.3  #Abx allergy: No Known Allergies    Creatinine, Serum: <0.5 (04-21-23 @ 20:54)    Weight (kg): 113.4 (04-21-23 @ 10:18)    RECOMMENDATIONS  - f/u ecoli sensitivities   - Anticipate D/C with midline and IV antibiotics regimen pending CX (If S to Ceftriaxone can do Ceftriaxone 2g q24h IV +PO flagyl 500mg TID, otherwise Ertapenem 1g q24h IV) x 3-4 weeks with repeat CTAP w/ as outpatient to determine final course  - SEND ESR, CRP  - zosyn 3.375 q8h IV extended infusion  - Weekly CBC, CMP, ESR/CRP  - ID follow-up with Dr. Ryan Rm for Telehealth. We will call the patient between 10:30-6:30      [address] [facility][note 3]       [phone]       Fax [phone]     If any questions, please call or send a message on [employer] Teams  Please continue to update ID with any pertinent new laboratory or radiographic findings  #[id number]  
ASSESSMENT:  32yF w/ PMHx of appendectomy s/p lap appendectomy at [facility] on April 10, 2023, who presented to the ED with chief complaint of fevers. Patient reports that she was recently discharge on Tuesday with PO Augmentin. Patient reports that yesterday she started having episodes of fevers. The fever was accompanied with nausea, 2 episodes of vomiting and diarrhea. Patient reports she has been compliant with antibiotics, but symptoms are still persistent. Physical exam findings, imaging, and labs as documented above. CT scan demonstrating large fluid/air abscess collection    PLAN:  -IR consult  -IV hydration  - NPO  -IV ABX  -Pain control  - F/u blood cultures     
32yF w/ PMHx of appendectomy s/p lap appendectomy at [facility] on April 10, 2023, who presented to the ED with chief complaint of fevers. Patient reports that she was recently discharge on Tuesday with PO Augmentin. Patient reports that yesterday she started having episodes of fevers. The fever was accompanied with nausea, 2 episodes of vomiting and diarrhea. Patient reports she has been compliant with antibiotics, but symptoms are still persistent. Physical exam findings, imaging, and labs as documented above. CT scan demonstrating large fluid/air abscess collection    PLAN:  - Continue IV abx   - Pain control   - Monitor fevers, HR   - Monitor WBC, Hgb   - F/u blood cultures   - NPO, IVF   - ID reqs appreciated; f/u after cultures result    [phone]
ASSESSMENT:  32yF w/ PMHx of appendectomy s/p lap appendectomy at [facility] on April 10, 2023, who presented to the ED with chief complaint of fevers. Patient reports that she was recently discharge on Tuesday with PO Augmentin. Patient reports that yesterday she started having episodes of fevers. The fever was accompanied with nausea, 2 episodes of vomiting and diarrhea. Patient reports she has been compliant with antibiotics, but symptoms are still persistent. Physical exam findings, imaging, and labs as documented above. CT scan demonstrating large fluid/air abscess collection    PLAN:  - Continue IV abx   - Pain control   - Monitor fevers, HR   - Monitor WBC, Hgb   - F/u blood cultures   - NPO, IVF     [phone]

## 2023-04-24 LAB
ANION GAP SERPL CALC-SCNC: 11 MMOL/L — SIGNIFICANT CHANGE UP (ref 7–14)
ANION GAP SERPL CALC-SCNC: 14 MMOL/L — SIGNIFICANT CHANGE UP (ref 7–14)
BASOPHILS # BLD AUTO: 0.04 K/UL — SIGNIFICANT CHANGE UP (ref 0–0.2)
BASOPHILS # BLD AUTO: 0.05 K/UL — SIGNIFICANT CHANGE UP (ref 0–0.2)
BASOPHILS NFR BLD AUTO: 0.3 % — SIGNIFICANT CHANGE UP (ref 0–1)
BASOPHILS NFR BLD AUTO: 0.4 % — SIGNIFICANT CHANGE UP (ref 0–1)
BUN SERPL-MCNC: 4 MG/DL — LOW (ref 10–20)
BUN SERPL-MCNC: <3 MG/DL — LOW (ref 10–20)
CALCIUM SERPL-MCNC: 8 MG/DL — LOW (ref 8.4–10.5)
CALCIUM SERPL-MCNC: 8.2 MG/DL — LOW (ref 8.4–10.5)
CHLORIDE SERPL-SCNC: 101 MMOL/L — SIGNIFICANT CHANGE UP (ref 98–110)
CHLORIDE SERPL-SCNC: 104 MMOL/L — SIGNIFICANT CHANGE UP (ref 98–110)
CO2 SERPL-SCNC: 23 MMOL/L — SIGNIFICANT CHANGE UP (ref 17–32)
CO2 SERPL-SCNC: 26 MMOL/L — SIGNIFICANT CHANGE UP (ref 17–32)
CREAT SERPL-MCNC: 0.5 MG/DL — LOW (ref 0.7–1.5)
CREAT SERPL-MCNC: <0.5 MG/DL — LOW (ref 0.7–1.5)
CRP SERPL-MCNC: 189.6 MG/L — HIGH
EGFR: 126 ML/MIN/1.73M2 — SIGNIFICANT CHANGE UP
EGFR: 133 ML/MIN/1.73M2 — SIGNIFICANT CHANGE UP
EOSINOPHIL # BLD AUTO: 0.2 K/UL — SIGNIFICANT CHANGE UP (ref 0–0.7)
EOSINOPHIL # BLD AUTO: 0.2 K/UL — SIGNIFICANT CHANGE UP (ref 0–0.7)
EOSINOPHIL NFR BLD AUTO: 1.5 % — SIGNIFICANT CHANGE UP (ref 0–8)
EOSINOPHIL NFR BLD AUTO: 1.6 % — SIGNIFICANT CHANGE UP (ref 0–8)
ERYTHROCYTE [SEDIMENTATION RATE] IN BLOOD: 120 MM/HR — HIGH (ref 0–20)
GLUCOSE SERPL-MCNC: 100 MG/DL — HIGH (ref 70–99)
GLUCOSE SERPL-MCNC: 114 MG/DL — HIGH (ref 70–99)
HCT VFR BLD CALC: 25.3 % — LOW (ref 37–47)
HGB BLD-MCNC: 7.6 G/DL — LOW (ref 12–16)
IMM GRANULOCYTES NFR BLD AUTO: 2.9 % — HIGH (ref 0.1–0.3)
IMM GRANULOCYTES NFR BLD AUTO: 3.3 % — HIGH (ref 0.1–0.3)
LYMPHOCYTES # BLD AUTO: 2.74 K/UL — SIGNIFICANT CHANGE UP (ref 1.2–3.4)
LYMPHOCYTES # BLD AUTO: 2.74 K/UL — SIGNIFICANT CHANGE UP (ref 1.2–3.4)
LYMPHOCYTES # BLD AUTO: 20.2 % — LOW (ref 20.5–51.1)
LYMPHOCYTES # BLD AUTO: 21.5 % — SIGNIFICANT CHANGE UP (ref 20.5–51.1)
MAGNESIUM SERPL-MCNC: 2.4 MG/DL — SIGNIFICANT CHANGE UP (ref 1.8–2.4)
MAGNESIUM SERPL-MCNC: 2.4 MG/DL — SIGNIFICANT CHANGE UP (ref 1.8–2.4)
MCHC RBC-ENTMCNC: 23.8 PG — LOW (ref 27–31)
MCHC RBC-ENTMCNC: 30 G/DL — LOW (ref 32–37)
MCV RBC AUTO: 79.1 FL — LOW (ref 81–99)
MONOCYTES # BLD AUTO: 0.73 K/UL — HIGH (ref 0.1–0.6)
MONOCYTES # BLD AUTO: 0.73 K/UL — HIGH (ref 0.1–0.6)
MONOCYTES NFR BLD AUTO: 5.4 % — SIGNIFICANT CHANGE UP (ref 1.7–9.3)
MONOCYTES NFR BLD AUTO: 5.7 % — SIGNIFICANT CHANGE UP (ref 1.7–9.3)
NEUTROPHILS # BLD AUTO: 8.64 K/UL — HIGH (ref 1.4–6.5)
NEUTROPHILS # BLD AUTO: 9.44 K/UL — HIGH (ref 1.4–6.5)
NEUTROPHILS NFR BLD AUTO: 67.6 % — SIGNIFICANT CHANGE UP (ref 42.2–75.2)
NEUTROPHILS NFR BLD AUTO: 69.6 % — SIGNIFICANT CHANGE UP (ref 42.2–75.2)
NRBC # BLD: 0 /100 WBCS — SIGNIFICANT CHANGE UP (ref 0–0)
NRBC # BLD: 0 /100 WBCS — SIGNIFICANT CHANGE UP (ref 0–0)
PHOSPHATE SERPL-MCNC: 3.1 MG/DL — SIGNIFICANT CHANGE UP (ref 2.1–4.9)
PHOSPHATE SERPL-MCNC: 3.7 MG/DL — SIGNIFICANT CHANGE UP (ref 2.1–4.9)
PLATELET # BLD AUTO: 552 K/UL — HIGH (ref 130–400)
PMV BLD: 10.2 FL — SIGNIFICANT CHANGE UP (ref 7.4–10.4)
POTASSIUM SERPL-MCNC: 4.2 MMOL/L — SIGNIFICANT CHANGE UP (ref 3.5–5)
POTASSIUM SERPL-MCNC: 4.3 MMOL/L — SIGNIFICANT CHANGE UP (ref 3.5–5)
POTASSIUM SERPL-SCNC: 4.2 MMOL/L — SIGNIFICANT CHANGE UP (ref 3.5–5)
POTASSIUM SERPL-SCNC: 4.3 MMOL/L — SIGNIFICANT CHANGE UP (ref 3.5–5)
RBC # BLD: 3.2 M/UL — LOW (ref 4.2–5.4)
RBC # FLD: 15.7 % — HIGH (ref 11.5–14.5)
SODIUM SERPL-SCNC: 138 MMOL/L — SIGNIFICANT CHANGE UP (ref 135–146)
SODIUM SERPL-SCNC: 141 MMOL/L — SIGNIFICANT CHANGE UP (ref 135–146)
WBC # BLD: 12.77 K/UL — HIGH (ref 4.8–10.8)
WBC # FLD AUTO: 12.77 K/UL — HIGH (ref 4.8–10.8)

## 2023-04-24 RX ADMIN — ONDANSETRON 4 MILLIGRAM(S): 8 TABLET, FILM COATED ORAL at 12:19

## 2023-04-24 RX ADMIN — ONDANSETRON 4 MILLIGRAM(S): 8 TABLET, FILM COATED ORAL at 20:23

## 2023-04-24 RX ADMIN — Medication 650 MILLIGRAM(S): at 12:20

## 2023-04-24 RX ADMIN — PIPERACILLIN AND TAZOBACTAM 25 GRAM(S): 4; .5 INJECTION, POWDER, LYOPHILIZED, FOR SOLUTION INTRAVENOUS at 12:19

## 2023-04-24 RX ADMIN — OXYCODONE HYDROCHLORIDE 5 MILLIGRAM(S): 5 TABLET ORAL at 20:46

## 2023-04-24 RX ADMIN — PIPERACILLIN AND TAZOBACTAM 25 GRAM(S): 4; .5 INJECTION, POWDER, LYOPHILIZED, FOR SOLUTION INTRAVENOUS at 18:23

## 2023-04-24 RX ADMIN — Medication 650 MILLIGRAM(S): at 05:31

## 2023-04-24 RX ADMIN — PIPERACILLIN AND TAZOBACTAM 25 GRAM(S): 4; .5 INJECTION, POWDER, LYOPHILIZED, FOR SOLUTION INTRAVENOUS at 03:28

## 2023-04-24 RX ADMIN — ENOXAPARIN SODIUM 40 MILLIGRAM(S): 100 INJECTION SUBCUTANEOUS at 17:45

## 2023-04-24 RX ADMIN — Medication 650 MILLIGRAM(S): at 20:04

## 2023-04-24 RX ADMIN — Medication 650 MILLIGRAM(S): at 12:24

## 2023-04-25 ENCOUNTER — TRANSCRIPTION ENCOUNTER (OUTPATIENT)
Age: 33
End: 2023-04-25

## 2023-04-25 VITALS
OXYGEN SATURATION: 98 % | SYSTOLIC BLOOD PRESSURE: 120 MMHG | RESPIRATION RATE: 18 BRPM | TEMPERATURE: 96 F | HEART RATE: 83 BPM | DIASTOLIC BLOOD PRESSURE: 65 MMHG

## 2023-04-25 LAB
CULTURE RESULTS: SIGNIFICANT CHANGE UP
CULTURE RESULTS: SIGNIFICANT CHANGE UP
SPECIMEN SOURCE: SIGNIFICANT CHANGE UP
SPECIMEN SOURCE: SIGNIFICANT CHANGE UP

## 2023-04-25 RX ORDER — ONDANSETRON 8 MG/1
1 TABLET, FILM COATED ORAL
Qty: 7 | Refills: 0
Start: 2023-04-25

## 2023-04-25 RX ORDER — ERTAPENEM SODIUM 1 G/1
1 INJECTION, POWDER, LYOPHILIZED, FOR SOLUTION INTRAMUSCULAR; INTRAVENOUS
Qty: 0 | Refills: 0 | DISCHARGE
Start: 2023-04-25

## 2023-04-25 RX ORDER — ERTAPENEM SODIUM 1 G/1
1000 INJECTION, POWDER, LYOPHILIZED, FOR SOLUTION INTRAMUSCULAR; INTRAVENOUS EVERY 24 HOURS
Refills: 0 | Status: DISCONTINUED | OUTPATIENT
Start: 2023-04-25 | End: 2023-04-25

## 2023-04-25 RX ADMIN — PIPERACILLIN AND TAZOBACTAM 25 GRAM(S): 4; .5 INJECTION, POWDER, LYOPHILIZED, FOR SOLUTION INTRAVENOUS at 04:36

## 2023-04-25 RX ADMIN — ERTAPENEM SODIUM 120 MILLIGRAM(S): 1 INJECTION, POWDER, LYOPHILIZED, FOR SOLUTION INTRAMUSCULAR; INTRAVENOUS at 11:59

## 2023-04-25 RX ADMIN — ONDANSETRON 4 MILLIGRAM(S): 8 TABLET, FILM COATED ORAL at 14:26

## 2023-04-25 NOTE — PROGRESS NOTE ADULT - SUBJECTIVE AND OBJECTIVE BOX
INTERVENTIONAL RADIOLOGY BRIEF-OPERATIVE NOTE    Procedure:  Percutaneous, CT-directed drainage of pelvic collection    Pre-Op Diagnosis:  Pelvic abscess    Post-Op Diagnosis:  Infected hematoma    Attending:  Mel  Resident:   None    Anesthesia (type):  [ ] General Anesthesia  [X] Sedation-- Versed, 3mg and Fentanyl, 100mcg, iv (in divided doses)  [ ] Spinal Anesthesia  [X] Local/Regional-- 1% Lidocaine, 15 cc, SQ, RLQ    Total Face-to-Face Sedation Time:  39 minutes    Contrast:   None    Blood Loss:  3 cc    Condition:   [ ] Critical  [ ] Serious  [ ] Fair   [X] Good    Findings/Follow up Plan of Care:  Dominant collection in the mid-pelvis above the bladder accessed with 5-Fr, 15cm Skater centesis system with CT-guidance, followed by uneventful placement of 12-[place] pigtail catheter:  245cc of malodorous serosanguinous fluid aspirated, with appreciable clearing.  Specimens sent for cultures.  JANNA bulb placed.  Patient tolerated very well, without incident.    Specimens Removed:  50cc sent for cultures    Implants:  None    Complications:  None immediate    Disposition:  Back to floor;  Maintain catheter to JANNA bulb and monitor output q shift;  Please f/u specimen cultures.       Please call Interventional Radiology [phone] with any questions, concerns, or issues.        
 SURGERY PROGRESS NOTE     Patient: NADIA ALMAGUER , 32y (86)Female   MRN: [id number]  Location: [address]  Visit: 23 Inpatient  Date: 23 @ 01:03       Events of past 24 hours:  Patient seen resting comfortably in bed. No acute events overnight. Hemodynamically stable. Tolerating diet. Midline placed for venous access.     PAST MEDICAL & SURGICAL HISTORY:  H/O appendicitis    History of laparoscopic appendectomy     Vitals:   T(F): 98.7 (23 @ 23:55), Max: 100.2 (23 @ 19:23)  HR: 103 (23 @ 23:55)  BP: 102/56 (23 @ 23:55)  RR: 18 (23 @ 23:55)  SpO2: 98% (23 @ 23:55)      Diet, DASH/TLC:   Sodium & Cholesterol Restricted      Fluids: dextrose 5% + sodium chloride 0.45%.: Solution, 1000 milliLiter(s) infuse at 75 mL/Hr      I & O's:    23 @ 07:01  -  23 @ 07:00  --------------------------------------------------------  IN:    IV PiggyBack: 575 mL    Lactated Ringers: 690 mL  Total IN: 1265 mL    OUT:    Bulb (mL): 125 mL    Indwelling Catheter - Urethral (mL): 1050 mL  Total OUT: 1175 mL    Total NET: 90 mL           PHYSICAL EXAM:   General: NAD, AAOx3, calm and cooperative  Cardiac: RRR S1, S2  Respiratory: CTAB, normal respiratory effort  Abdomen: Soft, non-distended, non-tender, no rebound, no guarding. +BS.  Vascular: Pulses 2+ throughout, extremities well perfused  Skin: Warm/dry, normal color, no jaundice  Incision/wound: healing well, dressings in place, clean, dry and intact    MEDICATIONS  (STANDING):  acetaminophen     Tablet .. 650 milliGRAM(s) Oral every 6 hours  dextrose 5% + sodium chloride 0.45%. 1000 milliLiter(s) (75 mL/Hr) IV Continuous <Continuous>  enoxaparin Injectable 40 milliGRAM(s) SubCutaneous every 24 hours  piperacillin/tazobactam IVPB.. 3.375 Gram(s) IV Intermittent every 6 hours  potassium chloride  20 mEq/100 mL IVPB 20 milliEquivalent(s) IV Intermittent every 2 hours  potassium phosphate IVPB 15 milliMole(s) IV Intermittent once    MEDICATIONS  (PRN):  ketorolac   Injectable 15 milliGRAM(s) IV Push every 6 hours PRN Severe Pain (7 - 10)  ondansetron Injectable 4 milliGRAM(s) IV Push every 6 hours PRN Nausea and/or Vomiting  oxyCODONE    IR 5 milliGRAM(s) Oral every 6 hours PRN Severe Pain (7 - 10)      DVT PROPHYLAXIS: enoxaparin Injectable 40 milliGRAM(s) SubCutaneous every 24 hours    GI PROPHYLAXIS:   ANTICOAGULATION:   ANTIBIOTICS:  piperacillin/tazobactam IVPB.. 3.375 Gram(s)            LAB/STUDIES:  Labs:  CAPILLARY BLOOD GLUCOSE                              7.7    17.95 )-----------( 475      ( 2023 22:31 )             24.0       Auto Neutrophil %: 79.5 % (23 @ 22:31)  Auto Immature Granulocyte %: 1.6 % (23 @ 22:31)        136  |  99  |  4<L>  ----------------------------<  138<H>  3.6   |  26  |  <0.5<L>      Calcium, Total Serum: 7.9 mg/dL (23 @ 22:31)        Urinalysis Basic - ( 2023 18:00 )    Color: Yellow / Appearance: Clear / S.020 / pH: x  Gluc: x / Ketone: Large  / Bili: Negative / Urobili: <2 mg/dL   Blood: x / Protein: 30 mg/dL / Nitrite: Negative   Leuk Esterase: Negative / RBC: 4 /HPF / WBC 3 /HPF   Sq Epi: x / Non Sq Epi: x / Bacteria: Negative        Culture - Acid Fast - Body Fluid w/Smear (collected 2023 13:23)  Source: .Body Fluid None    Culture - Body Fluid with Gram Stain (collected 2023 13:23)  Source: .Body Fluid None  Gram Stain (2023 22:29):    Upon re-evaluation of gram stain:    Numerous polymorphonuclear leukocytes per low power field    Few Gram Negative Rods per oil power field    Previously reported as:    Numerous polymorphonuclear leukocytes per low power field    No organisms seen  Preliminary Report (2023 21:04):    Numerous Escherichia coli    Culture - Urine (collected 2023 13:10)  Source: Clean Catch Clean Catch (Midstream)  Final Report (2023 18:51):    <10,000 CFU/mL Normal Urogenital Elina    Culture - Blood (collected 2023 13:10)  Source: .Blood Blood-Peripheral  Preliminary Report (2023 23:01):    No growth to date.    Culture - Blood (collected 2023 13:10)  Source: .Blood Blood-Peripheral  Preliminary Report (2023 23:01):    No growth to date.          
GENERAL SURGERY PROGRESS NOTE    Patient: NADIA ALMAGUER , 32y (86)Female   MRN: [id number]  Location: [address]  Visit: 23 Inpatient  Date: 23 @ 01:06    Hospital Day #: 3  Post-Op Day #: 12    Events of past 24 hours: Patient went with IR for drainage,JANNA drain left in place. Patient febrile max 101.4 at 2PM. Patient tachycardic, 1L bolus given. No chest pain, HA, or dizziness.     PAST MEDICAL & SURGICAL HISTORY:  H/O appendicitis      History of laparoscopic appendectomy          Vitals:   T(F): 100.5 (23 @ 00:43), Max: 101.4 (23 @ 14:43)  HR: 113 (23 @ 00:43)  BP: 110/57 (23 @ 00:43)  RR: 18 (23 @ 00:43)  SpO2: 95% (23 @ 00:43)      Diet, NPO:   Except Medications      Fluids:     I & O's:      Bowel Movement: : [] YES [] NO  Flatus: : [] YES [] NO    PHYSICAL EXAM:  General: NAD, AAOx3, calm and cooperative  Cardiac: RRR   Respiratory: CTAB, normal respiratory effort  Abdomen: Soft, non-distended, RLQ tenderness to palpation, no rebound, no guarding, clean post surgical wounds. RLQ JANNA drain in place   Musculoskeletal:ROM intact, compartments soft  Skin: Warm/dry, normal color, no jaundice    MEDICATIONS  (STANDING):  acetaminophen     Tablet .. 650 milliGRAM(s) Oral every 6 hours  enoxaparin Injectable 40 milliGRAM(s) SubCutaneous every 24 hours  lactated ringers. 1000 milliLiter(s) (115 mL/Hr) IV Continuous <Continuous>  piperacillin/tazobactam IVPB.. 3.375 Gram(s) IV Intermittent every 6 hours    MEDICATIONS  (PRN):  ketorolac   Injectable 15 milliGRAM(s) IV Push every 6 hours PRN Severe Pain (7 - 10)  oxyCODONE    IR 5 milliGRAM(s) Oral every 6 hours PRN Severe Pain (7 - 10)      DVT PROPHYLAXIS: enoxaparin Injectable 40 milliGRAM(s) SubCutaneous every 24 hours    GI PROPHYLAXIS:   ANTICOAGULATION:   ANTIBIOTICS:  piperacillin/tazobactam IVPB.. 3.375 Gram(s)            LAB/STUDIES:  Labs:  CAPILLARY BLOOD GLUCOSE                              7.9    21.79 )-----------( 440      ( 2023 20:54 )             25.5       Auto Neutrophil %: 83.7 % (23 @ 20:54)  Auto Immature Granulocyte %: 1.1 % (23 @ 20:54)        140  |  104  |  4<L>  ----------------------------<  89  4.0   |  23  |  <0.5<L>      Calcium, Total Serum: 7.7 mg/dL (23 @ 20:54)      LFTs:             6.4  | 0.5  | 18       ------------------[133     ( 2023 15:04 )  3.1  | x    | 17          Lipase:x      Amylase:x             Coags:     15.60  ----< 1.36    ( 2023 00:38 )     x                   Urinalysis Basic - ( 2023 18:00 )    Color: Yellow / Appearance: Clear / S.020 / pH: x  Gluc: x / Ketone: Large  / Bili: Negative / Urobili: <2 mg/dL   Blood: x / Protein: 30 mg/dL / Nitrite: Negative   Leuk Esterase: Negative / RBC: 4 /HPF / WBC 3 /HPF   Sq Epi: x / Non Sq Epi: x / Bacteria: Negative        Culture - Body Fluid with Gram Stain (collected 2023 13:23)  Source: .Body Fluid None  Gram Stain (2023 23:42):    Numerous polymorphonuclear leukocytes seen per low power field    No organisms seen per oil power field    Culture - Urine (collected 2023 13:10)  Source: Clean Catch Clean Catch (Midstream)  Final Report (2023 18:51):    <10,000 CFU/mL Normal Urogenital Elina    Culture - Blood (collected 2023 13:10)  Source: .Blood Blood-Peripheral  Preliminary Report (2023 23:01):    No growth to date.    Culture - Blood (collected 2023 13:10)  Source: .Blood Blood-Peripheral  Preliminary Report (2023 23:01):    No growth to date.              IMAGING:    
GENERAL SURGERY PROGRESS NOTE    Patient: NADIA ALMAGUER , 32y ([birth year])Female   MRN: [id number]  Location: [address]  Visit: 04-20-23 Inpatient  Date: 04-25-23 @ 01:44    Hospital Day #: 6  Post-Op Day #: 15      Events of past 24 hours: ID recommendations noted. Midline placed on 22nd. IVF decreased to 50. One episode of emesis overnight, given Zofran.    PAST MEDICAL & SURGICAL HISTORY:  H/O appendicitis      History of laparoscopic appendectomy      Vitals:   T(F): 98.1 (04-25-23 @ 00:53), Max: 98.7 (04-24-23 @ 04:42)  HR: 88 (04-25-23 @ 00:53)  BP: 100/58 (04-25-23 @ 00:53)  RR: 18 (04-25-23 @ 00:53)  SpO2: 98% (04-25-23 @ 00:53)      Diet, DASH/TLC:   Sodium & Cholesterol Restricted      Fluids:     I & O's:    04-23-23 @ 07:01  -  04-24-23 @ 07:00  --------------------------------------------------------  IN:    dextrose 5% + sodium chloride 0.45%: 500 mL  Total IN: 500 mL    OUT:    Bulb (mL): 45 mL  Total OUT: 45 mL    Total NET: 455 mL        PHYSICAL EXAM:  General: NAD, AAOx3, calm and cooperative  HEENT: NCAT, CHRISTOPHER, EOMI, Trachea ML, Neck supple  Cardiac: RRR S1, S2  Respiratory: Normal respiratory effort on RA  Abdomen: Minor tenderness. JANNA w/ SS output appreciated. Soft, non-distended, non-tender, no rebound, no guarding.  Musculoskeletal: Strength 5/5 BL UE/LE, ROM intact, compartments soft  Neuro: Sensation grossly intact and equal throughout, no focal deficits  Vascular: Extremities well perfused  Skin: Warm/dry, normal color, no jaundice  Incision/wound: healing well, dressings in place, clean, dry and intact    MEDICATIONS  (STANDING):  acetaminophen     Tablet .. 650 milliGRAM(s) Oral every 6 hours  enoxaparin Injectable 40 milliGRAM(s) SubCutaneous every 24 hours  piperacillin/tazobactam IVPB.. 3.375 Gram(s) IV Intermittent every 8 hours    MEDICATIONS  (PRN):  ketorolac   Injectable 15 milliGRAM(s) IV Push every 6 hours PRN Severe Pain (7 - 10)  ondansetron Injectable 4 milliGRAM(s) IV Push every 6 hours PRN Nausea and/or Vomiting  oxyCODONE    IR 5 milliGRAM(s) Oral every 6 hours PRN Severe Pain (7 - 10)      DVT PROPHYLAXIS: enoxaparin Injectable 40 milliGRAM(s) SubCutaneous every 24 hours    GI PROPHYLAXIS:   ANTICOAGULATION:   ANTIBIOTICS:  piperacillin/tazobactam IVPB.. 3.375 Gram(s)            LAB/STUDIES:  Labs:  CAPILLARY BLOOD GLUCOSE                              7.6    12.77 )-----------( 552      ( 24 Apr 2023 21:11 )             25.3       Auto Neutrophil %: 67.6 % (04-24-23 @ 21:11)  Auto Immature Granulocyte %: 3.3 % (04-24-23 @ 21:11)    04-24    138  |  101  |  4<L>  ----------------------------<  100<H>  4.3   |  23  |  0.5<L>      Calcium, Total Serum: 8.2 mg/dL (04-24-23 @ 21:11)        Assessment:  32yF w/ PMH of appendectomy s/p lap appendectomy at [facility] on April 10, 2023, who presented to the ED with chief complaint of fevers. Patient reports that she was recently discharge on Tuesday with PO Augmentin. Patient reports that yesterday she started having episodes of fevers. The fever was accompanied with nausea, 2 episodes of vomiting and diarrhea. Patient reports she has been compliant with antibiotics, but symptoms are still persistent. Physical exam findings, imaging, and labs as documented above. CT scan demonstrating large fluid/air abscess collection    PLAN:  - Pain control   - Monitor fevers, HR   - Monitor WBC, Hgb   - F/u blood cultures   - NPO, IVF   - Cultures: Ecoli: F/u sensititives  - ID: Zosyn q8, CRP, ESR, Ceftriaxone 2g q24 IV + PO Flagyl 500mg TID vs Ertapenem x 3-4 weeks pending sensitivities  - Midline placed 4/22    Green Surgery Spectra  [phone]      
GENERAL SURGERY PROGRESS NOTE    Patient: NADIA ALMAGUER , 32y ([birth year])Female   MRN: [id number]  Location: [address]  Visit: 04-20-23 Inpatient  Date: 04-24-23 @ 01:55    Hospital Day #: 5   Post-Op Day #: 14    Procedure/Dx/Injuries:    Events of past 24 hours: ID recommendations appreciated. IVF decreased to 50. One episode of emesis. Patient had nausea resistant to Zofran, given one time dose of Compazine. CRP resulted at 189.6, ESR pending. Drain flushed. Patient reports pain about the same.     PAST MEDICAL & SURGICAL HISTORY:  H/O appendicitis  History of laparoscopic appendectomy      Vitals:   T(F): 98.5 (04-24-23 @ 00:06), Max: 98.6 (04-23-23 @ 04:56)  HR: 98 (04-24-23 @ 00:06)  BP: 117/71 (04-24-23 @ 00:06)  RR: 18 (04-24-23 @ 00:06)  SpO2: 99% (04-24-23 @ 00:06)      Diet, DASH/TLC:   Sodium & Cholesterol Restricted      Fluids: dextrose 5% + sodium chloride 0.45%.: Solution, 1000 milliLiter(s) infuse at 50 mL/Hr      I & O's:    04-22-23 @ 07:01  -  04-23-23 @ 07:00  --------------------------------------------------------  IN:  Total IN: 0 mL    OUT:    Bulb (mL): 50 mL    Indwelling Catheter - Urethral (mL): 400 mL    Voided (mL): 1400 mL  Total OUT: 1850 mL    Total NET: -1850 mL    Bowel Movement: : [x] YES [] NO  Flatus: : [x] YES [] NO    PHYSICAL EXAM:  General: NAD, AAOx3, calm and cooperative  HEENT: NCAT, CHRISTOPHER, EOMI, Trachea ML, Neck supple  Cardiac: RRR S1, S2  Respiratory: Normal respiratory effort on RA  Abdomen: Minor tenderness. JANNA w/ SS output appreciated. Soft, non-distended, non-tender, no rebound, no guarding.  Musculoskeletal: Strength 5/5 BL UE/LE, ROM intact, compartments soft  Neuro: Sensation grossly intact and equal throughout, no focal deficits  Vascular: Extremities well perfused  Skin: Warm/dry, normal color, no jaundice  Incision/wound: healing well, dressings in place, clean, dry and intact    MEDICATIONS  (STANDING):  acetaminophen     Tablet .. 650 milliGRAM(s) Oral every 6 hours  dextrose 5% + sodium chloride 0.45%. 1000 milliLiter(s) (50 mL/Hr) IV Continuous <Continuous>  enoxaparin Injectable 40 milliGRAM(s) SubCutaneous every 24 hours  piperacillin/tazobactam IVPB.. 3.375 Gram(s) IV Intermittent every 8 hours    MEDICATIONS  (PRN):  ketorolac   Injectable 15 milliGRAM(s) IV Push every 6 hours PRN Severe Pain (7 - 10)  ondansetron Injectable 4 milliGRAM(s) IV Push every 6 hours PRN Nausea and/or Vomiting  oxyCODONE    IR 5 milliGRAM(s) Oral every 6 hours PRN Severe Pain (7 - 10)      DVT PROPHYLAXIS: enoxaparin Injectable 40 milliGRAM(s) SubCutaneous every 24 hours    GI PROPHYLAXIS:   ANTICOAGULATION:   ANTIBIOTICS:  piperacillin/tazobactam IVPB.. 3.375 Gram(s)      LAB/STUDIES:  Labs:                        7.6    13.55 )-----------( 515      ( 23 Apr 2023 23:23 )             24.7       Auto Neutrophil %: 69.6 % (04-23-23 @ 23:23)  Auto Immature Granulocyte %: 2.9 % (04-23-23 @ 23:23)    04-23    141  |  104  |  <3<L>  ----------------------------<  114<H>  4.2   |  26  |  <0.5<L>      Calcium, Total Serum: 8.0 mg/dL (04-23-23 @ 23:23)      LFTs:      Coags:      Culture - Acid Fast - Body Fluid w/Smear (collected 21 Apr 2023 13:23)  Source: .Body Fluid None    Culture - Body Fluid with Gram Stain (collected 21 Apr 2023 13:23)  Source: .Body Fluid None  Gram Stain (22 Apr 2023 22:29):    Upon re-evaluation of gram stain:    Numerous polymorphonuclear leukocytes per low power field    Few Gram Negative Rods per oil power field    Previously reported as:    Numerous polymorphonuclear leukocytes per low power field    No organisms seen  Preliminary Report (23 Apr 2023 16:57):    Numerous Escherichia coli    Numerous Escherichia coli #2  Organism: Escherichia coli  Escherichia coli (23 Apr 2023 16:57)  Organism: Escherichia coli (23 Apr 2023 16:57)  Organism: Escherichia coli (23 Apr 2023 16:56)      IMAGING:  Nothing in past 24 hours.    · Assessment	  32yF w/ PMH of appendectomy s/p lap appendectomy at [facility] on April 10, 2023, who presented to the ED with chief complaint of fevers. Patient reports that she was recently discharge on Tuesday with PO Augmentin. Patient reports that yesterday she started having episodes of fevers. The fever was accompanied with nausea, 2 episodes of vomiting and diarrhea. Patient reports she has been compliant with antibiotics, but symptoms are still persistent. Physical exam findings, imaging, and labs as documented above. CT scan demonstrating large fluid/air abscess collection    PLAN:  - Pain control   - Monitor fevers, HR   - Monitor WBC, Hgb   - F/u blood cultures   - NPO, IVF   - Cultures: Ecoli: F/u sensititives  - ID: Zosyn q8, CRP, ESR, Ceftriaxone 2g q24 IV + PO Flagyl 500mg TID vs Ertapenem x 3-4 weeks pending sensitivities  - Midline placed 4/22    Green Surgery Spectra  [phone]
GENERAL SURGERY PROGRESS NOTE    Patient: NADIA ALMAGUER , 32y (86)Female   MRN: [id number]  Location: [facility] ED Hold 017 A  Visit: 23 Inpatient  Date: 23 @ 01:42    Hospital Day #: 2  Post-Op Day #: 11    Events of past 24 hours: Admitted to surgery for large RLQ abscess collection. IV abx    PAST MEDICAL & SURGICAL HISTORY:  H/O appendicitis      History of laparoscopic appendectomy          Vitals:   T(F): 99.1 (23 @ 18:54), Max: 103 (23 @ 12:06)  HR: 115 (23 @ 00:29)  BP: 108/55 (23 @ 00:29)  RR: 18 (23 @ 00:29)  SpO2: 100% (23 @ 00:29)      Diet, NPO:   Except Medications      Fluids: lactated ringers.: Solution, 1000 milliLiter(s) infuse at 115 mL/Hr      I & O's:    Bowel Movement: : [] YES [] NO  Flatus: : [] YES [] NO    PHYSICAL EXAM:  General: NAD, AAOx3, calm and cooperative  Cardiac: RRR   Respiratory: CTAB, normal respiratory effort  Abdomen: Soft, non-distended, RLQ tenderness to palpation, no rebound, no guarding, clean post surgical wounds  Musculoskeletal:ROM intact, compartments soft  Skin: Warm/dry, normal color, no jaundice  Incision/wound: healing well, dressings in place, clean, dry and intact    MEDICATIONS  (STANDING):  acetaminophen     Tablet .. 650 milliGRAM(s) Oral every 6 hours  enoxaparin Injectable 40 milliGRAM(s) SubCutaneous every 24 hours  lactated ringers. 1000 milliLiter(s) (115 mL/Hr) IV Continuous <Continuous>  piperacillin/tazobactam IVPB.- 3.375 Gram(s) IV Intermittent once  piperacillin/tazobactam IVPB.- 3.375 Gram(s) IV Intermittent once  piperacillin/tazobactam IVPB.. 3.375 Gram(s) IV Intermittent every 6 hours    MEDICATIONS  (PRN):  ketorolac   Injectable 15 milliGRAM(s) IV Push every 6 hours PRN Severe Pain (7 - 10)  oxyCODONE    IR 5 milliGRAM(s) Oral every 6 hours PRN Severe Pain (7 - 10)      DVT PROPHYLAXIS: enoxaparin Injectable 40 milliGRAM(s) SubCutaneous every 24 hours    GI PROPHYLAXIS:   ANTICOAGULATION:   ANTIBIOTICS:  piperacillin/tazobactam IVPB.- 3.375 Gram(s)  piperacillin/tazobactam IVPB.- 3.375 Gram(s)  piperacillin/tazobactam IVPB.. 3.375 Gram(s)            LAB/STUDIES:  Labs:  CAPILLARY BLOOD GLUCOSE                              7.6    23.10 )-----------( 426      ( 2023 00:38 )             23.6       Auto Neutrophil %: 87.6 % (23 @ 00:38)  Auto Immature Granulocyte %: 1.0 % (23 @ 00:38)  Auto Neutrophil %: 88.7 % (23 @ 13:10)  Auto Immature Granulocyte %: 0.8 % (23 @ 13:10)        138  |  102  |  4<L>  ----------------------------<  120<H>  3.5   |  24  |  0.5<L>      Calcium, Total Serum: 8.0 mg/dL (23 @ 00:38)      LFTs:             6.4  | 0.5  | 18       ------------------[133     ( 2023 15:04 )  3.1  | x    | 17          Lipase:x      Amylase:x             Coags:     15.60  ----< 1.36    ( 2023 00:38 )     x                   Urinalysis Basic - ( 2023 13:10 )    Color: Yellow / Appearance: Slightly Turbid / S.023 / pH: x  Gluc: x / Ketone: Small  / Bili: Negative / Urobili: 3 mg/dL   Blood: x / Protein: 100 mg/dL / Nitrite: Negative   Leuk Esterase: Negative / RBC: 3 /HPF / WBC 9 /HPF   Sq Epi: x / Non Sq Epi: x / Bacteria: Negative                IMAGING:    CT Abdomen and Pelvis w/ Oral Cont and w/ IV Cont (23 @ 18:10) >    Compared to recent CT abdomen 4/15/2023,    Interval development of multiple fluid collections with locules of   air/abscesses withinthe right lower quadrant/pelvis largest measuring   approximately 12.4 x 5.6 cm.    Mildly distended loops of small bowel. Oral contrast reaches the splenic   flexure.    New/increased size multiple subcentimeter retroperitoneal lymph nodes   likely reactive    Additional findings are unchanged in this short interval follow-up CT.    < end of copied text >    < from: CT Abdomen and Pelvis w/ IV Cont (04.15.23 @ 06:17) >    Post appendectomy withmetallic suture material at the region of the   cecum, likely corresponding to foreign body finding on outside report    Mesentery edema and fat stranding adjacent to cecum. No contained fluid   collection with wall enhancement to suggest abscess. Nointra-abdominal   free air. Findings suggestive of phlegmonous changes/severe inflammatory   changes.    
TRIPP, NADIA  32y, Female  Allergy: No Known Allergies      LOS  3d    CHIEF COMPLAINT: Abscess Collection s/p Appendectomy (2023 01:03)      INTERVAL EVENTS/HPI  - No acute events overnight IR cx ecoli, still having abd pains  - T(F): , Max: 100.2 (23 @ 19:23)  - Denies any worsening symptoms  - Tolerating medication  - WBC Count: 17.95 (23 @ 22:31)  WBC Count: 21.79 (23 @ 20:54)     - Creatinine, Serum: <0.5 (23 @ 22:31)  Creatinine, Serum: <0.5 (23 @ 20:54)       ROS  General: Denies rigors, nightsweats  HEENT: Denies headache, rhinorrhea, sore throat, eye pain  CV: Denies CP, palpitations  PULM: Denies wheezing, hemoptysis  GI: Denies hematemesis, hematochezia, melena  : Denies discharge, hematuria  MSK: Denies arthralgias, myalgias  SKIN: Denies rash, lesions  NEURO: Denies paresthesias, weakness  PSYCH: Denies depression, anxiety    VITALS:  T(F): 96.7, Max: 100.2 (23 @ 19:23)  HR: 93  BP: 108/55  RR: 18Vital Signs Last 24 Hrs  T(C): 35.9 (2023 12:07), Max: 37.9 (2023 19:23)  T(F): 96.7 (2023 12:07), Max: 100.2 (2023 19:23)  HR: 93 (2023 12:07) (93 - 108)  BP: 108/55 (2023 12:07) (102/56 - 130/68)  BP(mean): --  RR: 18 (2023 12:07) (18 - 18)  SpO2: 98% (2023 12:07) (97% - 99%)    Parameters below as of 2023 04:56  Patient On (Oxygen Delivery Method): room air        PHYSICAL EXAM:  Gen: NAD, resting in bed obese  HEENT: Normocephalic, atraumatic  Neck: supple, no lymphadenopathy  CV: Regular rate & regular rhythm  Lungs: decreased BS at bases, no fremitus  Abdomen: Soft, BS present LLQ tenderness to palpation RLQ JANNA serosanguinous fluid   Ext: Warm, well perfused  Neuro: non focal, awake  Skin: no rash, no erythema  Lines: no phlebitis    FH: Non-contributory  Social Hx: Non-contributory    TESTS & MEASUREMENTS:                        7.7    17.95 )-----------( 475      ( 2023 22:31 )             24.0         136  |  99  |  4<L>  ----------------------------<  138<H>  3.6   |  26  |  <0.5<L>    Ca    7.9<L>      2023 22:31  Phos  2.4       Mg     2.2               Urinalysis Basic - ( 2023 18:00 )    Color: Yellow / Appearance: Clear / S.020 / pH: x  Gluc: x / Ketone: Large  / Bili: Negative / Urobili: <2 mg/dL   Blood: x / Protein: 30 mg/dL / Nitrite: Negative   Leuk Esterase: Negative / RBC: 4 /HPF / WBC 3 /HPF   Sq Epi: x / Non Sq Epi: x / Bacteria: Negative        Culture - Acid Fast - Body Fluid w/Smear (collected 23 @ 13:23)  Source: .Body Fluid None    Culture - Body Fluid with Gram Stain (collected 23 @ 13:23)  Source: .Body Fluid None  Gram Stain (23 @ 22:29):    Upon re-evaluation of gram stain:    Numerous polymorphonuclear leukocytes per low power field    Few Gram Negative Rods per oil power field    Previously reported as:    Numerous polymorphonuclear leukocytes per low power field    No organisms seen  Preliminary Report (23 @ 21:04):    Numerous Escherichia coli    Culture - Urine (collected 23 @ 13:10)  Source: Clean Catch Clean Catch (Midstream)  Final Report (23 @ 18:51):    <10,000 CFU/mL Normal Urogenital Brennen    Culture - Blood (collected 23 @ 13:10)  Source: .Blood Blood-Peripheral  Preliminary Report (23 @ 23:01):    No growth to date.    Culture - Blood (collected 23 @ 13:10)  Source: .Blood Blood-Peripheral  Preliminary Report (23 @ 23:01):    No growth to date.    Culture - Blood (collected 04-15-23 @ 16:25)  Source: .Blood Blood-Peripheral  Final Report (23 @ 03:00):    No Growth Final    Culture - Blood (collected 04-15-23 @ 16:25)  Source: .Blood Blood-Peripheral  Final Report (23 @ 03:00):    No Growth Final    Culture - Urine (collected 04-15-23 @ 07:02)  Source: Clean Catch Clean Catch (Midstream)  Final Report (23 @ 13:42):    50,000 - 99,000 CFU/mL Enterococcus faecalis    <10,000 CFU/ml Normal Urogenital brennen present  Organism: Enterococcus faecalis (23 @ 13:42)  Organism: Enterococcus faecalis (23 @ 13:42)      Method Type: TIFFANY      -  Ampicillin: S <=2 Predicts results to ampicillin/sulbactam, amoxacillin-clavulanate and  piperacillin-tazobactam.      -  Ciprofloxacin: S <=1      -  Levofloxacin: S 1      -  Nitrofurantoin: S <=32 Should not be used to treat pyelonephritis.      -  Tetracycline: R >8      -  Vancomycin: S 2            INFECTIOUS DISEASES TESTING  COVID-19 PCR: NotDetec (04-15-23 @ 17:52)      INFLAMMATORY MARKERS      RADIOLOGY & ADDITIONAL TESTS:  I have personally reviewed the last available Chest xray  CXR      CT  CT Abdomen and Pelvis w/ Oral Cont and w/ IV Cont:   ACC: [id number] EXAM:  CT ABDOMEN AND PELVIS OC IC   ORDERED BY: EILEEN CHRISTIAN     PROCEDURE DATE:  2023          INTERPRETATION:  CLINICAL STATEMENT: Abdominal pain.    TECHNIQUE: Contiguous axial CT images were obtained of the abdomen and   pelvis following administration of intravenous contrast.  Oral contrast   was administered. Reformatted images in the coronal and sagittal planes   were acquired.    COMPARISON CT: 4/15/2023    FINDINGS:    LOWER CHEST: No significant change    HEPATOBILIARY: No significant change.    SPLEEN: Stable resolution of trace perisplenic ascites    PANCREAS: No significant change.    ADRENAL GLANDS: No significant change    KIDNEYS: No significant change    ABDOMINOPELVIC NODES: New/increased size multiple subcentimeter   retroperitoneal lymph nodes likely reactive    PELVIC ORGANS: No significant change. Please note that the uterus is not   well delineated due to adjacent pathology.    PERITONEUM/MESENTERY/BOWEL: Interval development of multiple fluid   collections with locules of air/abscesses within the right lower   quadrant/pelvis largest measuring approximately 12.4 x 5.6 cm. Mildly   distended loops of small bowel. Oral contrast reaches the splenic   flexure. Additional findings are unchanged in this short interval   follow-up CT.    BONES/SOFT TISSUES: No evidence of acute osseous abnormality      IMPRESSION:  Compared to recent CT abdomen 4/15/2023,    Interval development of multiple fluid collections with locules of   air/abscesses withinthe right lower quadrant/pelvis largest measuring   approximately 12.4 x 5.6 cm.    Mildly distended loops of small bowel. Oral contrast reaches the splenic   flexure.    New/increased size multiple subcentimeter retroperitoneal lymph nodes   likely reactive    Additional findings are unchanged in this short interval follow-up CT.    --- End of Report ---            ATTILA HUNT MD; Attending Radiologist  This document has been electronically signed. 2023  6:39PM (23 @ 18:10)      CARDIOLOGY TESTING  12 Lead ECG:   Ventricular Rate 109 BPM    Atrial Rate 109 BPM    P-R Interval 124 ms    QRS Duration 66 ms    Q-T Interval 322 ms    QTC Calculation(Bazett) 433 ms    P Axis 65 degrees    R Axis 60 degrees    T Axis 46 degrees    Diagnosis Line Sinus tachycardia  Otherwise normal ECG    Confirmed by Ronny Hathaway ([id number]) on 2023 8:09:35 PM (23 @ 18:17)      MEDICATIONS  acetaminophen     Tablet .. 650 Oral every 6 hours  dextrose 5% + sodium chloride 0.45%. 1000 IV Continuous <Continuous>  enoxaparin Injectable 40 SubCutaneous every 24 hours  piperacillin/tazobactam IVPB.. 3.375 IV Intermittent every 6 hours      WEIGHT  Weight (kg): 113.4 (23 @ 10:18)  Creatinine, Serum: <0.5 mg/dL (23 @ 22:31)      ANTIBIOTICS:  piperacillin/tazobactam IVPB.. 3.375 Gram(s) IV Intermittent every 6 hours      All available historical records have been reviewed

## 2023-04-25 NOTE — DISCHARGE NOTE PROVIDER - HOSPITAL COURSE
4/20/23: 32yF w/ PMHx of appendectomy s/p lap appendectomy at [facility] on April 10, 2023, who presented to the ED with chief complaint of fevers. Patient reports that she was recently discharge on Tuesday with PO Augmentin. Patient reports that yesterday she started having episodes of fevers. The fever was accompanied with nausea, 2 episodes of vomiting and diarrhea. Patient reports she has been compliant with antibiotics, but symptoms are still persistent. Patient admitted to general surgery under Dr. Kim. Interventional Radiology consulted for possible IR drainage.     4/21/23: Intervnetional radiology drained abscess, with 50mL of purulent fluid removed and sent for cultures. Patient tolerated procedure but developed tachycardia and fever afterwards. She was given IV Tylenol and bolused with lactated ringers.     4/22/23: Patient remains NPO on IV fluids and antibiotics. Infectious disease consulted with recommendations for IV antibiotics and midline placement prior to discharge. Patient was started on IV Zosyn extended infusion. Midline placed by general surgery team.     4/23/23: Patient advanced to clear liquid diet and tolerated procedure well. She remains hemodynamically stable, afebrile, and is voiding spontaneously. Later, patient developed episode of nausea/vomiting and was given compazine. We are continuing to monitor drain output which is serosanguineous.     4/24/23: Patient tolerating diet, is hemodynamically stable, afebrile, and voiding spontaneously. Pain is well controlled and denies nausea, vomiting, fever, and chills. Case management working on discharge and setting up infusion company.     4/25/23: Patient cleared for discharge. Infusion company to start on 4/26/23. Patient received first dose of ertapenem here in hospital prior to discharge.

## 2023-04-25 NOTE — DISCHARGE NOTE PROVIDER - CARE PROVIDER_API CALL
Galindo Leal)  [facility]  [address] [place] [id number]  Phone: [phone]  Fax: [phone]  Follow Up Time:     Sae Kim)  Surgery  [address] [place] [address]  Phone: [phone]  Fax: [phone]  Follow Up Time:     Jessica Rogers)  Infectious Disease; Internal Medicine  [address] [place] [address]  Phone: [phone]  Fax: [phone]  Follow Up Time:

## 2023-04-25 NOTE — PROGRESS NOTE ADULT - REASON FOR ADMISSION
Abscess Collection s/p Appendectomy

## 2023-04-25 NOTE — DISCHARGE NOTE PROVIDER - PROVIDER TOKENS
PROVIDER:[TOKEN:[[id number]]],PROVIDER:[TOKEN:[[id number]]],PROVIDER:[TOKEN:[[id number]]]

## 2023-04-25 NOTE — DISCHARGE NOTE PROVIDER - NSDCCPCAREPLAN_GEN_ALL_CORE_FT
PRINCIPAL DISCHARGE DIAGNOSIS  Diagnosis: Abdominopelvic abscess  Assessment and Plan of Treatment: Pain Control: Please take tylenol and motrin as needed for pain. You may altnernate between the two every three hours as needed. Call the office if you develop severe pain or pain that is not controlled by tylenol/motrin.   Activity: As tolerated. Avoid heavy lifting for the next few weeks as you are recovering from your appendectomy.   Antibiotics: You have been prescribed Ertapenem IV for 4 weeks. Infusion services have been set up for you to start on 4/26/23. You will be given one dose of Ertapenem here in the hospital prior to discharge.   Follow up:   - Dr. Rogers (Infectious Disease); You will require a repeat CT scan in 3-4 weeks to determine final duration of antibiotics.   - Dr. Leal (Interventional Radiology)  - Dr. Kim     PRINCIPAL DISCHARGE DIAGNOSIS  Diagnosis: Abdominopelvic abscess  Assessment and Plan of Treatment: Pain Control: Please take tylenol and motrin as needed for pain. You may altnernate between the two every three hours as needed. Call the office if you develop severe pain or pain that is not controlled by tylenol/motrin.   Activity: As tolerated. Avoid heavy lifting for the next few weeks as you are recovering from your appendectomy.   Antibiotics: You have been prescribed Ertapenem IV for 4 weeks. Infusion services have been set up for you to start on 4/26/23. You will be given one dose of Ertapenem here in the hospital prior to discharge.  Anti-Emetic: You have been prescribed Zofran to be taken as needed for nausea.    Follow up:   - Dr. Rogers (Infectious Disease); You will require a repeat CT scan in 3-4 weeks to determine final duration of antibiotics.   - Dr. Leal (Interventional Radiology)  - Dr. Kim

## 2023-04-25 NOTE — DISCHARGE NOTE NURSING/CASE MANAGEMENT/SOCIAL WORK - PATIENT PORTAL LINK FT
You can access the FollowMyHealth Patient Portal offered by [facility] by registering at the following website: [email or web address]. By joining [employer]’s FollowMyHealth portal, you will also be able to view your health information using other applications (apps) compatible with our system.

## 2023-04-25 NOTE — DISCHARGE NOTE PROVIDER - NSDCMRMEDTOKEN_GEN_ALL_CORE_FT
amoxicillin-clavulanate 875 mg-125 mg oral tablet: 1 tab(s) orally every 12 hours   ertapenem 1 g injection: 1 gram(s) intravenous every 24 hours  ondansetron 4 mg oral tablet: 1 tab(s) orally every 6 hours as needed for  nausea MDD: 4 tab  ondansetron 4 mg oral tablet: 1 tab(s) orally every 6 hours as needed for  nausea MDD: 4 tab

## 2023-04-26 LAB
CULTURE RESULTS: SIGNIFICANT CHANGE UP
ORGANISM # SPEC MICROSCOPIC CNT: SIGNIFICANT CHANGE UP
SPECIMEN SOURCE: SIGNIFICANT CHANGE UP

## 2023-04-26 NOTE — CDI QUERY NOTE - NSCDIOTHERTXTBX2_GEN_ALL_CORE_FT
Query:  Based on your clinical evaluation of the patient, please clarify the significance of BMI of 44.3:  • BMI 44.3 is associated with morbid obesity. Patient needs to follow up with nutritionist.  • Insignificant BMI of 44.3  • Other (please specify).  • Unable to determine the significance of BMI 44.3    Clinical indicators:  Nurse Flowsheet:  - 4/16: Weight: 250 pounds / 113.4 Kg             Height: 5 feet and 3 inches / 160.02 cm             BMI 44.3 Query:  Based on your clinical evaluation of the patient, please clarify the significance of BMI of 44.3:  • BMI 44.3 is associated with morbid obesity. Patient needs to follow up with nutritionist.  • Insignificant BMI of 44.3  • Other (please specify).  • Unable to determine the significance of BMI 44.3                                                            =================================  Clinical indicators:  Nurse Flowsheet:  - 4/16: Weight: 250 pounds / 113.4 Kg             Height: 5 feet and 3 inches / 160.02 cm             BMI 44.3

## 2023-04-26 NOTE — CDI QUERY NOTE - NSCDIOTHERTXTBX2_GEN_ALL_CORE_FT
DOCUMENTATION CLARIFICATION FORM     Encounter #: [id number]                                             Patient’s Name: Leny Murphy  Medical Record #: [id number]                                          Admit Date: 2023  : 1994                                                               Discharged: 2023  CDI Specialist/: Memo                                           Contact #: [phone]    Dear Dr. Kim,                  Date: 2023    The Physician’s or Provider’s documentation of the patient’s presentation, evaluation and  medical management, as identified below, may support a diagnosis that is not documented in the medical record.  In order to accurately capture all diagnoses to the greatest degree of specificity reflecting the patient’s actual severity of illness, the documentation in this patient’s medical record requires additional clarification.  Please include more specific documentation, either known or suspected, of a corresponding diagnosis associated with the clinical information described below in your Progress Note and/or Discharge Summary.  QUERY  Based on your professional judgment and the clinical indicators, please clarify if the abdominopelvic abscess can be further specified as:  •	Abdominopelvic abscess is associated with the previous surgical procedure  •	Abdominopelvic abscess is unassociated with previous surgical procedure  •	Other (please specify):  •	Clinically unable to determine    CLINICAL INDICATORS   Documentation  •	 Surgery Consult… 32yF w/ PMHx of appendectomy s/p lap appendectomy at [facility] on April 10, 2023, who presented to the ED with chief complaint of fevers. Patient reports that she was recently discharge on Tuesday with PO Augmentin. Patient reports that yesterday she started having episodes of fevers. The fever was accompanied with nausea, 2 episodes of vomiting and diarrhea. Patient reports she has been compliant with antibiotics, but symptoms are still persistent… RLQ tenderness to palpation… clean post-surgical wounds  •	 H&P… Abscess Collection s/p Appendectomy  •	 DC Summary… Abscess Collection s/p Appendectomy… Interventional radiology drained abscess… IV antibiotics and midline placement… Diagnosis: Abdominopelvic abscess  Imaging   •	 CT Ab/Pelvis Radiology Impression… Compared to recent CT abdomen 4/15/2023… interval development of multiple fluid collections with locules of air/abscesses within the right lower quadrant/pelvis largest measuring approximately 12.4 x 5.6 cm. Mildly distended loops of small bowel. Oral contrast reaches the splenic flexure. New/increased size multiple subcentimeter retroperitoneal lymph nodes likely reactive…        Documentation clarification is required for compliance, accuracy in coding and billing, and reporting severity of illness, quality data   and risk of mortality.  --------------------------------------------------------------------------------------------------------------------------------------------  DO NOT REMOVE THIS RECORD WITHOUT FIRST NOTIFYING THE CDI SPECIALIST  This form is NOT a part of the permanent Medical Record.

## 2023-04-26 NOTE — CDI QUERY NOTE - NSCDIOTHERTXTBX_GEN_ALL_CORE_HH
DOCUMENTATION CLARIFICATION FORM   Encounter #: [id number]                                              Patient’s Name: Leny Murphy  Medical Record #: [id number]                                           Admit Date: 2023  : 1994                                                                Discharged: 2023  CDI Specialist/: Memo                                           Contact #: [phone]    Dear Dr. Kim,                      Date: 2023                  The Physician’s or Provider’s documentation of the patient’s presentation, evaluation and  medical management, as identified below, may support a diagnosis that is not documented in the medical record.  In order to accurately capture all diagnoses to the greatest degree of specificity reflecting the patient’s actual severity of illness, the documentation in this patient’s medical record requires additional clarification.  Please include more specific documentation, either known or suspected, of a corresponding diagnosis associated with the clinical information described below in your Progress Note and/or Discharge Summary.    QUERY  Based upon your clinical judgment and the clinical indicators below, please clarify if sepsis documented by the ID Consultant can be further specified as:  •	I concur with ID Consultant sepsis was treated and resolved by the time of discharge  •	I do not concur with the ID Consultant sepsis was evaluated and ruled out  •	Other (please specify): __________  •	Unable to clinically determine    CLINICAL INDICATORS  Documentation  •	 ED… Principal Discharge DX:	Abdominopelvic abscess… Sepsis – was this patient treated for sepsis? Yes. Presentation suggestive of: Bacterial Etiology.   •	 Surgery Consult… s/p lap appendectomy at [facility] on April 10, 2023, who presented to the ED with chief complaint of fevers. Patient reports that she was recently discharge on Tuesday with PO Augmentin…PE: … Abdomen: Soft, non-distended, RLQ tenderness to palpation, no rebound, no guarding, clean post-surgical wounds  •	 H&P… Abscess Collection s/p Appendectomy  •	 Surgery … CT scan demonstrating large fluid/air abscess collection  •	 Surgery… patient febrile max 101.4 at 2PM. Patient tachycardic, 1L bolus given.  •	 ID Consult… Intraabdominal abscess s/p appy 4/10 with Sepsis on admission T>101 P>90 WBC >12  •	 DC Summary… Diagnosis: Abdominopelvic abscess… Ertapenem IV for 4 weeks. Infusion services have been set up for you to start on 23  Labs/VS  •	4-20 WBCs 25.31 >23.10 > 21.79 > 17.95 >13.55 > 12.77  •	4-20 T 103 HR 120BP 117/65 > 98.7   •	4-20 Blood Culture Negative  •	2-21 Body Fluid Culture: + E. Coli    Documentation clarification is required for compliance, accuracy in coding and billing, and reporting severity of illness, quality data   and risk of mortality.  --------------------------------------------------------------------------------------------------------------------------------------------  DO NOT REMOVE THIS RECORD WITHOUT FIRST NOTIFYING THE CDI SPECIALIST  This form is NOT a part of the permanent Medical Record.

## 2023-04-29 NOTE — CHART NOTE - NSCHARTNOTEFT_GEN_A_CORE
CDI Documentation Clarification Form:    - post-operative phlegmonous changes were present on admission  - query #1: phlegmonous/severe inflammatory changes seen in CT abdomen are associated with postoperative intra-abdominal infection  - query #2: BMI 44.3 is associated with morbid obesity. Patient needs to follow up with nutritionist

## 2023-05-02 DIAGNOSIS — Z20.822 CONTACT WITH AND (SUSPECTED) EXPOSURE TO COVID-19: ICD-10-CM
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
## 2023-05-02 DIAGNOSIS — G89.18 OTHER ACUTE POSTPROCEDURAL PAIN: ICD-10-CM

## 2023-05-02 DIAGNOSIS — E66.01 MORBID (SEVERE) OBESITY DUE TO EXCESS CALORIES: ICD-10-CM

## 2023-05-02 DIAGNOSIS — Z90.49 ACQUIRED ABSENCE OF OTHER SPECIFIED PARTS OF DIGESTIVE TRACT: ICD-10-CM

## 2023-05-02 DIAGNOSIS — T81.40XA INFECTION FOLLOWING A PROCEDURE, UNSPECIFIED, INITIAL ENCOUNTER: ICD-10-CM

## 2023-05-04 NOTE — CHART NOTE - NSCHARTNOTEFT_GEN_A_CORE
CDI Clarification 1:  Based upon your clinical judgment and the clinical indicators below, please clarify if sepsis documented by the ID Consultant can be further specified as:  •	I concur with ID Consultant sepsis was treated and resolved by the time of discharge    CDI Clarification 2:  Based on your professional judgment and the clinical indicators, please clarify if the abdominopelvic abscess can be further specified as:  •	Abdominopelvic abscess is associated with the previous surgical procedure

## 2023-05-07 ENCOUNTER — EMERGENCY (EMERGENCY)
Facility: HOSPITAL | Age: 33
LOS: 0 days | Discharge: HOME | End: 2023-05-07
Attending: EMERGENCY MEDICINE
Payer: MEDICAID

## 2023-05-07 VITALS
TEMPERATURE: 98 F | SYSTOLIC BLOOD PRESSURE: 109 MMHG | OXYGEN SATURATION: 99 % | RESPIRATION RATE: 18 BRPM | DIASTOLIC BLOOD PRESSURE: 70 MMHG | HEART RATE: 95 BPM

## 2023-05-07 VITALS
HEART RATE: 98 BPM | HEIGHT: 63 IN | SYSTOLIC BLOOD PRESSURE: 121 MMHG | DIASTOLIC BLOOD PRESSURE: 82 MMHG | OXYGEN SATURATION: 99 % | RESPIRATION RATE: 18 BRPM | TEMPERATURE: 99 F
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
## 2023-05-07 DIAGNOSIS — T82.598A OTHER MECHANICAL COMPLICATION OF OTHER CARDIAC AND VASCULAR DEVICES AND IMPLANTS, INITIAL ENCOUNTER: ICD-10-CM

## 2023-05-07 DIAGNOSIS — Z90.49 ACQUIRED ABSENCE OF OTHER SPECIFIED PARTS OF DIGESTIVE TRACT: ICD-10-CM

## 2023-05-07 PROCEDURE — 99283 EMERGENCY DEPT VISIT LOW MDM: CPT

## 2023-05-07 PROCEDURE — 99284 EMERGENCY DEPT VISIT MOD MDM: CPT | Mod: 25

## 2023-05-07 PROCEDURE — 76937 US GUIDE VASCULAR ACCESS: CPT

## 2023-05-07 NOTE — ED ADULT TRIAGE NOTE - CHIEF COMPLAINT QUOTE
pt had midline in for abx, yesterday it was removed and was told to come to er to have new one placed.

## 2023-05-07 NOTE — ED PROVIDER NOTE - CLINICAL SUMMARY MEDICAL DECISION MAKING FREE TEXT BOX
32-year-old female with recent abscess following appendectomy, discharged home on ertapenem via midline, presents for replacement of midline as she states it was removed by the visiting nurse yesterday due to some drainage around the site and from the tip of the catheter. On review of systems reports some mild intermittent pinching sensation to the side of her JANNA drain but no significant pain. Denies all other symptoms including fever, chills, vomiting. On exam, afebrile, hemodynamically stable, saturating well on room air, NAD, well appearing, laying comfortably in bed, no WOB, speaking full sentences, head NCAT, EOMI grossly, MMM, RRR, breathing comfortably on room air, abd soft, NT, ND, AAO, CN's 3-12 grossly intact, ZAMBRANO spontaneously, skin warm, well perfused, no rashes or hives. No abdominal pain or tenderness, and appropriate JANNA drainage, with low suspicion for acute intra-abdominal process. Midline placed on the right arm. Patient is well appearing, NAD, afebrile, hemodynamically stable. Discharged with instructions in further symptomatic care, return precautions, and need for surgery f/u.

## 2023-05-07 NOTE — ED PROVIDER NOTE - PATIENT PORTAL LINK FT
You can access the FollowMyHealth Patient Portal offered by [facility] by registering at the following website: [email or web address]. By joining [employer]’s FollowMyHealth portal, you will also be able to view your health information using other applications (apps) compatible with our system.

## 2023-05-07 NOTE — ED PROVIDER NOTE - OBJECTIVE STATEMENT
32 year old female with a history of PMHx of appendectomy s/p lap appendectomy at [facility] on 04/03/2023 c/b intra abdominal abscess now on IV ertapenem outpatient presents for Midline insertion. Patient had left arm midline which infiltrated yesterday therefore VNS removed it. She will require midline for Abx through the end of the month. At this time she is asymptomatic denies fever, chills, chest pain, shortness of breath, abdominal pain, nausea, vomiting, diarrhea, constipation, dysuria, hematuria, lower extremity swelling, rash.

## 2023-05-10 DIAGNOSIS — A41.9 SEPSIS, UNSPECIFIED ORGANISM: ICD-10-CM

## 2023-05-10 DIAGNOSIS — T81.43XA INFECTION FOLLOWING A PROCEDURE, ORGAN AND SPACE SURGICAL SITE, INITIAL ENCOUNTER: ICD-10-CM

## 2023-05-10 DIAGNOSIS — T81.44XA SEPSIS FOLLOWING A PROCEDURE, INITIAL ENCOUNTER: ICD-10-CM

## 2023-05-10 DIAGNOSIS — Z79.2 LONG TERM (CURRENT) USE OF ANTIBIOTICS: ICD-10-CM

## 2023-05-10 DIAGNOSIS — K65.1 PERITONEAL ABSCESS: ICD-10-CM

## 2023-05-10 DIAGNOSIS — E66.01 MORBID (SEVERE) OBESITY DUE TO EXCESS CALORIES: ICD-10-CM

## 2023-05-10 DIAGNOSIS — B96.20 UNSPECIFIED ESCHERICHIA COLI [E. COLI] AS THE CAUSE OF DISEASES CLASSIFIED ELSEWHERE: ICD-10-CM
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
## 2023-05-10 DIAGNOSIS — K91.870 POSTPROCEDURAL HEMATOMA OF A DIGESTIVE SYSTEM ORGAN OR STRUCTURE FOLLOWING A DIGESTIVE SYSTEM PROCEDURE: ICD-10-CM

## 2023-05-10 DIAGNOSIS — R50.82 POSTPROCEDURAL FEVER: ICD-10-CM

## 2023-05-16 ENCOUNTER — RESULT REVIEW (OUTPATIENT)
Age: 33
End: 2023-05-16

## 2023-05-16 ENCOUNTER — OUTPATIENT (OUTPATIENT)
Dept: OUTPATIENT SERVICES | Facility: HOSPITAL | Age: 33
LOS: 1 days | End: 2023-05-16
Payer: MEDICAID

## 2023-05-16 DIAGNOSIS — Z00.8 ENCOUNTER FOR OTHER GENERAL EXAMINATION: ICD-10-CM

## 2023-05-16 DIAGNOSIS — K65.1 PERITONEAL ABSCESS: ICD-10-CM

## 2023-05-16 PROCEDURE — 74177 CT ABD & PELVIS W/CONTRAST: CPT | Mod: 26

## 2023-05-16 PROCEDURE — 74177 CT ABD & PELVIS W/CONTRAST: CPT

## 2023-05-17 DIAGNOSIS — K65.1 PERITONEAL ABSCESS: ICD-10-CM

## 2023-05-20 LAB
CULTURE RESULTS: SIGNIFICANT CHANGE UP
SPECIMEN SOURCE: SIGNIFICANT CHANGE UP

## 2023-05-22 ENCOUNTER — APPOINTMENT (OUTPATIENT)
Dept: INTERVENTIONAL RADIOLOGY/VASCULAR | Facility: CLINIC | Age: 33
End: 2023-05-22
Payer: MEDICAID

## 2023-05-22 VITALS
DIASTOLIC BLOOD PRESSURE: 90 MMHG | HEART RATE: 83 BPM | OXYGEN SATURATION: 99 % | TEMPERATURE: 97.8 F | SYSTOLIC BLOOD PRESSURE: 124 MMHG

## 2023-05-22 PROCEDURE — 99212 OFFICE O/P EST SF 10 MIN: CPT

## 2023-05-22 NOTE — PHYSICAL EXAM
[Alert] : alert [No Acute Distress] : no acute distress [Well Nourished] : well nourished [Well Developed] : well developed [Healthy Appearance] : healthy appearance [Normal Voice/Communication] : normal voice communication [Not Distended] : not distended [de-identified] : RLQ pigtail catheter secured with tape in place.  Dressing is c/d/i.

## 2023-05-22 NOTE — REASON FOR VISIT
[Follow-Up: _____] : a [unfilled] follow-up visit [FreeTextEntry1] : Post CT-guided drainage of pelvic abscess on 4/21/2023.  Patient presents for f/u.

## 2023-05-22 NOTE — DATA REVIEWED
[FreeTextEntry1] : CT A/P (5/16/2023):  size of collection is now  4.5 cm x 2.3 cm.  Pigtail catheter is stable, within collection..
Not applicable

## 2023-05-22 NOTE — HISTORY OF PRESENT ILLNESS
[FreeTextEntry1] : 31yo female with h/o appendicitis and treatment at [facility], who presented to ED at [facility] in April, 2023 with pelvic pain.  She was found to have a large multiloculated fluid collection >12cm in the pelvis, suspicious for abscess.  IR was consulted by surgery, Dr. Kim for CT-guided drainage, which was performed on 4/21/2023; following placement of a 12-Fr pigtail catheter, ~245 cc of serosanguinous malodorous fluid was aspirated.  Patient condition improved on 4/25/2023 and she was discharged home with a midline catheter on iv antibiotics.  Drainage stopped on 5/7, at which time it appeared brown, Patient endorses overall improved condition, without any sign of leakage of fluid around tube/soiling of dressing, pain or fevers.   Paitent reports she has one more day of iv antibiotics.\par \par The patient presents to IR clinic , having seen Dr. Kim, and having had a follow up CT A/P which shows significant reduction in size of the collection. We review imaging and discuss next step for catheter removal.

## 2023-05-22 NOTE — ASSESSMENT
[FreeTextEntry1] : 33yo female with h/o pelvic abscess secondary to appendicitis with placement of pigtail catheter by IR on 4/21/2023, with aspiration of 245cc of fluid.  Started on iv abx and D/C'd home 4 days later, to f/u with surgery, Dr. Kim and IR Dr. Leal after repeat imaging.  Abscess decreased in size from >12cm to 4.5 x 2.3 cm, with marked clinical improvement and no further external drainage since 5/7/2023.  No more fevers or pain reported by the patient.\par \par Case and imaging reviewed with patient.  Catheter removal is planned, but it should be done with fluoroscopic guidance, as it is in a deep pelvic collection, adjacent to bowel loops.  As such, image-directed removal over a guidewire will likely reduce any risk of injury to neighboring anatomy, ie) small bowel, colon, vessels, etc.\par \par This was explained to the patient, and images were shown.  All questions/concerns were answered/addressed with understanding.\par \par I informed the patient that I would make every effort to remove the catheter tomorrow and that she will be notified today as to the time.  
Statement Selected

## 2023-05-23 ENCOUNTER — TRANSCRIPTION ENCOUNTER (OUTPATIENT)
Age: 33
End: 2023-05-23

## 2023-05-23 ENCOUNTER — OUTPATIENT (OUTPATIENT)
Dept: OUTPATIENT SERVICES | Facility: HOSPITAL | Age: 33
LOS: 1 days | Discharge: HOME | End: 2023-05-23
Payer: MEDICAID

## 2023-05-23 DIAGNOSIS — Z87.19 PERSONAL HISTORY OF OTHER DISEASES OF THE DIGESTIVE SYSTEM: ICD-10-CM

## 2023-05-23 DIAGNOSIS — Z46.82 ENCOUNTER FOR FITTING AND ADJUSTMENT OF NON-VASCULAR CATHETER: ICD-10-CM

## 2023-05-23 PROCEDURE — C1769: CPT

## 2023-05-23 PROCEDURE — 49424 ASSESS CYST CONTRAST INJECT: CPT

## 2023-05-23 PROCEDURE — 76080 X-RAY EXAM OF FISTULA: CPT | Mod: 26

## 2023-05-23 PROCEDURE — 76080 X-RAY EXAM OF FISTULA: CPT

## 2023-05-23 NOTE — PROGRESS NOTE ADULT - REASON FOR ADMISSION
Image-guided removal of pelvic pigtail catheter over a guidewire
Image-guided removal of pelvic pigtail catheter

## 2023-05-23 NOTE — PROGRESS NOTE ADULT - SUBJECTIVE AND OBJECTIVE BOX
INTERVENTIONAL RADIOLOGY BRIEF-OPERATIVE NOTE    Procedure:  Fluoro-guided removal of pelvic pigtail catheter    Pre-Op Diagnosis:  Pelvic abscess    Post-Op Diagnosis:  Minimal fluid remains.  Catheter removed over a guidewire.    Attending:  Mel  Resident:  None    Anesthesia (type):  [ ] General Anesthesia  [ ] Sedation  [ ] Spinal Anesthesia  [ ] Local/Regional    Contrast:  None    Estimated Blood Loss:  None    Condition:   [ ] Critical  [ ] Serious  [ ] Fair   [X] Good    Findings/Follow up Plan of Care:  RLQ pigtail catheter removed over a Bentson guidewire under fluoroscopic control.  patient tolerated well, without incident.  Sterile dressing placed.    Specimens Removed:  None    Implants:  None    Complications:  None immediate.    Disposition:  D/C home.      Please call Interventional Radiology [phone] with any questions, concerns, or issues.        
PRE-OPERATIVE DAY OF PROCEDURE EVALUATION:     I have personally seen and examined this patient.  I agree with the history and physical which I have reviewed and noted any changes below:     Plan is for fluoroscopically-directed removal of pelvic pigtail catheter over a guidewire.    Procedure/ risks/ benefits/ goals/ alternatives were explained.  All questions answered. Informed content obtained from patient.  Consent placed in chart.

## 2023-05-23 NOTE — ASU DISCHARGE PLAN (ADULT/PEDIATRIC) - NS MD DC FALL RISK RISK
For information on Fall & Injury Prevention, visit: [email or web address] OR  [email or web address] OR  https://www.cdc.gov/steadi/patient.html

## 2023-06-07 LAB
CULTURE RESULTS: SIGNIFICANT CHANGE UP
SPECIMEN SOURCE: SIGNIFICANT CHANGE UP

## 2023-07-27 DIAGNOSIS — N93.9 ABNORMAL UTERINE AND VAGINAL BLEEDING, UNSPECIFIED: ICD-10-CM

## 2023-07-28 ENCOUNTER — APPOINTMENT (OUTPATIENT)
Dept: OBGYN | Facility: CLINIC | Age: 33
End: 2023-07-28
Payer: MEDICAID

## 2023-07-28 VITALS
HEART RATE: 86 BPM | BODY MASS INDEX: 44.3 KG/M2 | SYSTOLIC BLOOD PRESSURE: 140 MMHG | DIASTOLIC BLOOD PRESSURE: 90 MMHG | HEIGHT: 63 IN | WEIGHT: 250 LBS

## 2023-07-28 LAB
HCG UR QL: NEGATIVE
QUALITY CONTROL: YES

## 2023-07-28 PROCEDURE — 81025 URINE PREGNANCY TEST: CPT

## 2023-07-28 PROCEDURE — 76830 TRANSVAGINAL US NON-OB: CPT

## 2023-07-28 PROCEDURE — 99213 OFFICE O/P EST LOW 20 MIN: CPT | Mod: 25

## 2023-07-28 PROCEDURE — ZZZZZ: CPT

## 2023-07-28 NOTE — PROCEDURE
[Amenorrhea] : Amenorrhea [Transvaginal Ultrasound] : transvaginal ultrasound [FreeTextEntry4] : nl uterus, nl ovaries

## 2023-07-28 NOTE — DISCUSSION/SUMMARY
[FreeTextEntry1] : 32 yo po with amenorrhea\par ugc neg\par weigth loss d/w patient\par prenatal vitamins and reaction \par sono - wnl \par do ucg in 2 weeks \par if amenorrhea over a month call office\par

## 2023-07-28 NOTE — HISTORY OF PRESENT ILLNESS
[FreeTextEntry1] :  32 yo missed period lmp 06/15/23 took 2 at home pregnancy tests both negative.\par UCG neg here.\par pt takes mvi and prenatals  - has skin  outbreak [PapSmeardate] : 02/01/23

## 2023-09-26 NOTE — ED ADULT NURSE NOTE - HISTORY OF COVID-19 VACCINATION
Patient has had difficulty scheduling [facility] additional information provided for patient of alternative sites for testing.  Symptomatically says things are stable.   Yes

## 2024-01-05 ENCOUNTER — NON-APPOINTMENT (OUTPATIENT)
Age: 34
End: 2024-01-05

## 2024-01-18 NOTE — ED PROVIDER NOTE - OBJECTIVE STATEMENT
Patient called office 1/18/2024, reports increased anxiety due to pipes freezing in his trailer, afraid he is going to be kicked out of his home, asked if he could increase Pristiq to help with anxiety and panic, provider agreeable, we will increase to 50 mg daily.  Patient to call office back if needs sooner appointment than previously scheduled.  Electronically signed by ELIA Cespedes CNP on 1/18/2024 at 9:04 AM   
32 yold female to ED with no signif med hx; pt s/p elective AP at [facility] 4/10 by Dr. Kim without complications; pt developed fever and increasing abdominal pain to lower abdomen - see again at [facility] tonight labs wnl; ct done with J-shaped FB; pt eloped from hospital and came here; pt has ct report and labs done earlier today; pt c/o nausea no vomiting; mild dysuria;

## 2024-03-14 ENCOUNTER — APPOINTMENT (OUTPATIENT)
Dept: OBGYN | Facility: CLINIC | Age: 34
End: 2024-03-14
Payer: MEDICAID

## 2024-03-14 VITALS
WEIGHT: 260 LBS | BODY MASS INDEX: 46.07 KG/M2 | HEIGHT: 63 IN | DIASTOLIC BLOOD PRESSURE: 82 MMHG | HEART RATE: 81 BPM | SYSTOLIC BLOOD PRESSURE: 128 MMHG

## 2024-03-14 DIAGNOSIS — Z01.419 ENCOUNTER FOR GYNECOLOGICAL EXAMINATION (GENERAL) (ROUTINE) W/OUT ABNORMAL FINDINGS: ICD-10-CM

## 2024-03-14 PROCEDURE — 99395 PREV VISIT EST AGE 18-39: CPT

## 2024-03-14 NOTE — DISCUSSION/SUMMARY
[FreeTextEntry1] : 33-year-old para 0 annual exam, with slightly irregular periods, obesity -Pap HPV Weight loss discussed via increasing metformin dose to 875 twice daily and discussing weight loss drugs like Wegovy or stepdown with her PCP or endocrinologist Diet and exercise discussed Patient to do thyroid biopsy Prenatal vitamins prior to conception discussed If not pregnant for a year, return to the office for possible testing like as she is G semen analysis

## 2024-03-14 NOTE — HISTORY OF PRESENT ILLNESS
[Patient reported PAP Smear was normal] : Patient reported PAP Smear was normal [FreeTextEntry1] : 34 yo P-0 LMP-  3-1-2024 Is here for annual exam  , periods slightly irregular , some months regular , some just spotting , trying for pregnancy since November ,  3 years  Patient is morbidly obese and is on metformin 2 tablets p.o. nightly placed by her endocrinologist She is due for thyroid biopsy [Papeardate] : 2-2023

## 2024-03-14 NOTE — PHYSICAL EXAM
[Appropriately responsive] : appropriately responsive [No Acute Distress] : no acute distress [No Lymphadenopathy] : no lymphadenopathy [Alert] : alert [Non-tender] : non-tender [Soft] : soft [Non-distended] : non-distended [No HSM] : No HSM [No Lesions] : no lesions [No Mass] : no mass [Oriented x3] : oriented x3 [FreeTextEntry7] : Obesity [Examination Of The Breasts] : a normal appearance [No Masses] : no breast masses were palpable [No Discharge] : no discharge [Labia Majora] : normal [Labia Minora] : normal [Uterine Adnexae] : normal [Normal] : normal [FreeTextEntry6] : Difficult to assess adnexa due to body habitus

## 2024-03-14 NOTE — COUNSELING
[Vitamins/Supplements] : vitamins/supplements [Nutrition/ Exercise/ Weight Management] : nutrition, exercise, weight management [Preconception Care/ Fertility options] : preconception care, fertility options [Breast Self Exam] : breast self exam [Pregnancy Options] : pregnancy options

## 2024-03-19 LAB
CYTOLOGY CVX/VAG DOC THIN PREP: NORMAL
HPV HIGH+LOW RISK DNA PNL CVX: NOT DETECTED

## 2025-01-23 ENCOUNTER — NON-APPOINTMENT (OUTPATIENT)
Age: 35
End: 2025-01-23

## 2025-01-28 ENCOUNTER — APPOINTMENT (OUTPATIENT)
Dept: OBGYN | Facility: CLINIC | Age: 35
End: 2025-01-28
Payer: COMMERCIAL

## 2025-01-28 VITALS
HEART RATE: 81 BPM | SYSTOLIC BLOOD PRESSURE: 128 MMHG | BODY MASS INDEX: 47.13 KG/M2 | HEIGHT: 63 IN | DIASTOLIC BLOOD PRESSURE: 82 MMHG | WEIGHT: 266 LBS

## 2025-01-28 DIAGNOSIS — Z32.01 ENCOUNTER FOR PREGNANCY TEST, RESULT POSITIVE: ICD-10-CM

## 2025-01-28 DIAGNOSIS — N92.6 IRREGULAR MENSTRUATION, UNSPECIFIED: ICD-10-CM

## 2025-01-28 DIAGNOSIS — N92.1 EXCESSIVE AND FREQUENT MENSTRUATION WITH IRREGULAR CYCLE: ICD-10-CM

## 2025-01-28 PROCEDURE — 99213 OFFICE O/P EST LOW 20 MIN: CPT

## 2025-01-28 RX ORDER — DOXYCYCLINE HYCLATE 100 MG/1
100 CAPSULE ORAL
Qty: 4 | Refills: 0 | Status: ACTIVE | COMMUNITY
Start: 2025-01-28 | End: 1900-01-01

## 2025-01-28 RX ORDER — VITAMIN A, ASCORBIC ACID, VITAMIN D, .ALPHA.-TOCOPHEROL, THIAMINE MONONITRATE, RIBOFLAVIN, NIACIN, PYRIDOXINE HYDROCHLORIDE, FOLIC ACID, CYANOCOBALAMIN, CALCIUM, IRON, MAGNESIUM, ZINC, COPPER, AND DOCONEXENT 65-1-250MG
65-1 & 250 KIT ORAL
Qty: 30 | Refills: 6 | Status: ACTIVE | COMMUNITY
Start: 2025-01-28 | End: 1900-01-01

## 2025-03-18 DIAGNOSIS — Z31.89 ENCOUNTER FOR OTHER PROCREATIVE MANAGEMENT: ICD-10-CM

## 2025-03-20 ENCOUNTER — NON-APPOINTMENT (OUTPATIENT)
Age: 35
End: 2025-03-20

## 2025-03-25 ENCOUNTER — NON-APPOINTMENT (OUTPATIENT)
Age: 35
End: 2025-03-25